# Patient Record
Sex: MALE | Race: WHITE | URBAN - METROPOLITAN AREA
[De-identification: names, ages, dates, MRNs, and addresses within clinical notes are randomized per-mention and may not be internally consistent; named-entity substitution may affect disease eponyms.]

---

## 2017-01-01 ENCOUNTER — NURSING HOME VISIT (OUTPATIENT)
Dept: GERIATRICS | Facility: CLINIC | Age: 82
End: 2017-01-01
Payer: COMMERCIAL

## 2017-01-01 ENCOUNTER — TRANSFERRED RECORDS (OUTPATIENT)
Dept: HEALTH INFORMATION MANAGEMENT | Facility: CLINIC | Age: 82
End: 2017-01-01

## 2017-01-01 ENCOUNTER — ANTICOAGULATION THERAPY VISIT (OUTPATIENT)
Dept: NURSING | Facility: CLINIC | Age: 82
End: 2017-01-01

## 2017-01-01 ENCOUNTER — TELEPHONE (OUTPATIENT)
Dept: FAMILY MEDICINE | Facility: CLINIC | Age: 82
End: 2017-01-01

## 2017-01-01 ENCOUNTER — NURSING HOME VISIT (OUTPATIENT)
Dept: GERIATRICS | Facility: CLINIC | Age: 82
End: 2017-01-01

## 2017-01-01 ENCOUNTER — OFFICE VISIT (OUTPATIENT)
Dept: FAMILY MEDICINE | Facility: CLINIC | Age: 82
End: 2017-01-01
Payer: COMMERCIAL

## 2017-01-01 ENCOUNTER — DISCHARGE SUMMARY NURSING HOME (OUTPATIENT)
Dept: GERIATRICS | Facility: CLINIC | Age: 82
End: 2017-01-01
Payer: COMMERCIAL

## 2017-01-01 ENCOUNTER — TELEPHONE (OUTPATIENT)
Dept: GERIATRICS | Facility: CLINIC | Age: 82
End: 2017-01-01

## 2017-01-01 ENCOUNTER — MEDICAL CORRESPONDENCE (OUTPATIENT)
Dept: HEALTH INFORMATION MANAGEMENT | Facility: CLINIC | Age: 82
End: 2017-01-01

## 2017-01-01 VITALS
BODY MASS INDEX: 28.75 KG/M2 | OXYGEN SATURATION: 94 % | DIASTOLIC BLOOD PRESSURE: 90 MMHG | SYSTOLIC BLOOD PRESSURE: 181 MMHG | TEMPERATURE: 97.5 F | RESPIRATION RATE: 19 BRPM | WEIGHT: 183.2 LBS | HEART RATE: 69 BPM | HEIGHT: 67 IN

## 2017-01-01 VITALS
HEART RATE: 80 BPM | TEMPERATURE: 97.5 F | DIASTOLIC BLOOD PRESSURE: 98 MMHG | BODY MASS INDEX: 30.13 KG/M2 | WEIGHT: 192.4 LBS | OXYGEN SATURATION: 95 % | SYSTOLIC BLOOD PRESSURE: 181 MMHG | RESPIRATION RATE: 20 BRPM

## 2017-01-01 VITALS
RESPIRATION RATE: 24 BRPM | SYSTOLIC BLOOD PRESSURE: 148 MMHG | DIASTOLIC BLOOD PRESSURE: 74 MMHG | OXYGEN SATURATION: 95 % | HEART RATE: 80 BPM | TEMPERATURE: 98 F

## 2017-01-01 VITALS
HEART RATE: 78 BPM | BODY MASS INDEX: 30.13 KG/M2 | WEIGHT: 195.6 LBS | OXYGEN SATURATION: 91 % | BODY MASS INDEX: 30.64 KG/M2 | TEMPERATURE: 98.1 F | SYSTOLIC BLOOD PRESSURE: 118 MMHG | HEART RATE: 74 BPM | TEMPERATURE: 97.9 F | WEIGHT: 192.4 LBS | DIASTOLIC BLOOD PRESSURE: 111 MMHG | OXYGEN SATURATION: 98 % | RESPIRATION RATE: 17 BRPM | RESPIRATION RATE: 18 BRPM | SYSTOLIC BLOOD PRESSURE: 175 MMHG | DIASTOLIC BLOOD PRESSURE: 67 MMHG

## 2017-01-01 VITALS
BODY MASS INDEX: 27.25 KG/M2 | HEART RATE: 101 BPM | OXYGEN SATURATION: 91 % | SYSTOLIC BLOOD PRESSURE: 187 MMHG | RESPIRATION RATE: 24 BRPM | WEIGHT: 174 LBS | TEMPERATURE: 97.6 F | DIASTOLIC BLOOD PRESSURE: 104 MMHG

## 2017-01-01 VITALS
OXYGEN SATURATION: 95 % | BODY MASS INDEX: 27.25 KG/M2 | WEIGHT: 174 LBS | HEART RATE: 83 BPM | DIASTOLIC BLOOD PRESSURE: 69 MMHG | RESPIRATION RATE: 20 BRPM | TEMPERATURE: 96.3 F | SYSTOLIC BLOOD PRESSURE: 95 MMHG

## 2017-01-01 VITALS
DIASTOLIC BLOOD PRESSURE: 101 MMHG | RESPIRATION RATE: 20 BRPM | OXYGEN SATURATION: 95 % | TEMPERATURE: 97.7 F | HEART RATE: 66 BPM | SYSTOLIC BLOOD PRESSURE: 156 MMHG

## 2017-01-01 VITALS
DIASTOLIC BLOOD PRESSURE: 114 MMHG | SYSTOLIC BLOOD PRESSURE: 195 MMHG | RESPIRATION RATE: 20 BRPM | WEIGHT: 174 LBS | BODY MASS INDEX: 27.25 KG/M2 | OXYGEN SATURATION: 93 % | TEMPERATURE: 97.2 F | HEART RATE: 87 BPM

## 2017-01-01 VITALS
HEART RATE: 90 BPM | TEMPERATURE: 98.3 F | OXYGEN SATURATION: 95 % | RESPIRATION RATE: 18 BRPM | SYSTOLIC BLOOD PRESSURE: 176 MMHG | DIASTOLIC BLOOD PRESSURE: 113 MMHG | BODY MASS INDEX: 27.25 KG/M2 | WEIGHT: 174 LBS

## 2017-01-01 VITALS
DIASTOLIC BLOOD PRESSURE: 96 MMHG | OXYGEN SATURATION: 95 % | BODY MASS INDEX: 28.69 KG/M2 | WEIGHT: 183.2 LBS | RESPIRATION RATE: 20 BRPM | HEART RATE: 77 BPM | TEMPERATURE: 97.6 F | SYSTOLIC BLOOD PRESSURE: 161 MMHG

## 2017-01-01 VITALS
RESPIRATION RATE: 20 BRPM | OXYGEN SATURATION: 94 % | DIASTOLIC BLOOD PRESSURE: 119 MMHG | HEART RATE: 89 BPM | TEMPERATURE: 98.6 F | BODY MASS INDEX: 27.25 KG/M2 | WEIGHT: 174 LBS | SYSTOLIC BLOOD PRESSURE: 175 MMHG

## 2017-01-01 VITALS
SYSTOLIC BLOOD PRESSURE: 172 MMHG | RESPIRATION RATE: 22 BRPM | HEART RATE: 67 BPM | DIASTOLIC BLOOD PRESSURE: 96 MMHG | OXYGEN SATURATION: 94 % | TEMPERATURE: 97.4 F

## 2017-01-01 VITALS
DIASTOLIC BLOOD PRESSURE: 91 MMHG | HEART RATE: 89 BPM | TEMPERATURE: 98.6 F | WEIGHT: 180.4 LBS | BODY MASS INDEX: 28.25 KG/M2 | SYSTOLIC BLOOD PRESSURE: 167 MMHG | RESPIRATION RATE: 22 BRPM | OXYGEN SATURATION: 95 %

## 2017-01-01 VITALS
TEMPERATURE: 98.3 F | SYSTOLIC BLOOD PRESSURE: 154 MMHG | HEART RATE: 64 BPM | RESPIRATION RATE: 16 BRPM | HEIGHT: 67 IN | DIASTOLIC BLOOD PRESSURE: 74 MMHG | OXYGEN SATURATION: 93 %

## 2017-01-01 VITALS
DIASTOLIC BLOOD PRESSURE: 85 MMHG | SYSTOLIC BLOOD PRESSURE: 138 MMHG | RESPIRATION RATE: 20 BRPM | OXYGEN SATURATION: 97 % | HEART RATE: 71 BPM | TEMPERATURE: 97.5 F

## 2017-01-01 VITALS
RESPIRATION RATE: 20 BRPM | DIASTOLIC BLOOD PRESSURE: 80 MMHG | HEART RATE: 60 BPM | SYSTOLIC BLOOD PRESSURE: 134 MMHG | TEMPERATURE: 98.3 F

## 2017-01-01 VITALS
TEMPERATURE: 96.6 F | DIASTOLIC BLOOD PRESSURE: 64 MMHG | SYSTOLIC BLOOD PRESSURE: 132 MMHG | OXYGEN SATURATION: 97 % | WEIGHT: 189 LBS | HEIGHT: 67 IN | BODY MASS INDEX: 29.66 KG/M2 | HEART RATE: 66 BPM

## 2017-01-01 DIAGNOSIS — I63.412 CEREBRAL INFARCTION DUE TO EMBOLISM OF LEFT MIDDLE CEREBRAL ARTERY (H): ICD-10-CM

## 2017-01-01 DIAGNOSIS — G47.33 OSA (OBSTRUCTIVE SLEEP APNEA): ICD-10-CM

## 2017-01-01 DIAGNOSIS — I25.2 HISTORY OF MI (MYOCARDIAL INFARCTION): ICD-10-CM

## 2017-01-01 DIAGNOSIS — M25.562 ACUTE PAIN OF BOTH KNEES: Primary | ICD-10-CM

## 2017-01-01 DIAGNOSIS — M25.561 ACUTE PAIN OF BOTH KNEES: Primary | ICD-10-CM

## 2017-01-01 DIAGNOSIS — Z79.01 LONG-TERM (CURRENT) USE OF ANTICOAGULANTS: ICD-10-CM

## 2017-01-01 DIAGNOSIS — I48.0 PAROXYSMAL ATRIAL FIBRILLATION (H): ICD-10-CM

## 2017-01-01 DIAGNOSIS — R33.9 URINARY RETENTION: ICD-10-CM

## 2017-01-01 DIAGNOSIS — E87.1 HYPONATREMIA: ICD-10-CM

## 2017-01-01 DIAGNOSIS — Z86.73 H/O ISCHEMIC LEFT MCA STROKE: ICD-10-CM

## 2017-01-01 DIAGNOSIS — M25.562 ACUTE PAIN OF BOTH KNEES: ICD-10-CM

## 2017-01-01 DIAGNOSIS — I10 BENIGN ESSENTIAL HYPERTENSION: ICD-10-CM

## 2017-01-01 DIAGNOSIS — M1A.0690 CHRONIC GOUT OF KNEE, UNSPECIFIED CAUSE, UNSPECIFIED LATERALITY: ICD-10-CM

## 2017-01-01 DIAGNOSIS — I48.0 PAROXYSMAL ATRIAL FIBRILLATION (H): Primary | ICD-10-CM

## 2017-01-01 DIAGNOSIS — F03.90 DEMENTIA WITHOUT BEHAVIORAL DISTURBANCE, UNSPECIFIED DEMENTIA TYPE: ICD-10-CM

## 2017-01-01 DIAGNOSIS — R29.6 RECURRENT FALLS: Primary | ICD-10-CM

## 2017-01-01 DIAGNOSIS — I10 HYPERTENSION GOAL BP (BLOOD PRESSURE) < 140/90: Primary | ICD-10-CM

## 2017-01-01 DIAGNOSIS — R53.81 PHYSICAL DECONDITIONING: ICD-10-CM

## 2017-01-01 DIAGNOSIS — I63.412 CEREBRAL INFARCTION DUE TO EMBOLISM OF LEFT MIDDLE CEREBRAL ARTERY (H): Primary | ICD-10-CM

## 2017-01-01 DIAGNOSIS — M10.9 GOUT, UNSPECIFIED CAUSE, UNSPECIFIED CHRONICITY, UNSPECIFIED SITE: ICD-10-CM

## 2017-01-01 DIAGNOSIS — R41.89 COGNITIVE IMPAIRMENT: ICD-10-CM

## 2017-01-01 DIAGNOSIS — M15.0 PRIMARY OSTEOARTHRITIS INVOLVING MULTIPLE JOINTS: ICD-10-CM

## 2017-01-01 DIAGNOSIS — M25.562 ACUTE PAIN OF LEFT KNEE: Primary | ICD-10-CM

## 2017-01-01 DIAGNOSIS — F03.91 DEMENTIA WITH BEHAVIORAL DISTURBANCE, UNSPECIFIED DEMENTIA TYPE: ICD-10-CM

## 2017-01-01 DIAGNOSIS — Z86.73 HISTORY OF CVA (CEREBROVASCULAR ACCIDENT): ICD-10-CM

## 2017-01-01 DIAGNOSIS — I10 BENIGN ESSENTIAL HYPERTENSION: Primary | ICD-10-CM

## 2017-01-01 DIAGNOSIS — C64.1 RENAL CELL CARCINOMA OF RIGHT KIDNEY (H): ICD-10-CM

## 2017-01-01 DIAGNOSIS — I10 HYPERTENSION GOAL BP (BLOOD PRESSURE) < 140/90: ICD-10-CM

## 2017-01-01 DIAGNOSIS — E78.5 HYPERLIPIDEMIA, UNSPECIFIED HYPERLIPIDEMIA TYPE: ICD-10-CM

## 2017-01-01 DIAGNOSIS — I48.91 ATRIAL FIBRILLATION, UNSPECIFIED TYPE (H): ICD-10-CM

## 2017-01-01 DIAGNOSIS — E78.5 HYPERLIPIDEMIA LDL GOAL <130: ICD-10-CM

## 2017-01-01 DIAGNOSIS — M25.512 ACUTE PAIN OF LEFT SHOULDER: Primary | ICD-10-CM

## 2017-01-01 DIAGNOSIS — M17.0 OSTEOARTHRITIS OF BOTH KNEES, UNSPECIFIED OSTEOARTHRITIS TYPE: ICD-10-CM

## 2017-01-01 DIAGNOSIS — R60.0 LOCALIZED EDEMA: ICD-10-CM

## 2017-01-01 DIAGNOSIS — E78.2 MIXED HYPERLIPIDEMIA: ICD-10-CM

## 2017-01-01 DIAGNOSIS — R29.6 RECURRENT FALLS: ICD-10-CM

## 2017-01-01 DIAGNOSIS — J06.9 ACUTE URI: Primary | ICD-10-CM

## 2017-01-01 DIAGNOSIS — K21.9 GASTROESOPHAGEAL REFLUX DISEASE, ESOPHAGITIS PRESENCE NOT SPECIFIED: ICD-10-CM

## 2017-01-01 DIAGNOSIS — C64.1 RENAL CELL CARCINOMA, RIGHT (H): ICD-10-CM

## 2017-01-01 DIAGNOSIS — G93.40 ENCEPHALOPATHY: ICD-10-CM

## 2017-01-01 DIAGNOSIS — M25.562 ACUTE PAIN OF LEFT KNEE: ICD-10-CM

## 2017-01-01 DIAGNOSIS — E78.00 PURE HYPERCHOLESTEROLEMIA: ICD-10-CM

## 2017-01-01 DIAGNOSIS — M25.561 ACUTE PAIN OF BOTH KNEES: ICD-10-CM

## 2017-01-01 DIAGNOSIS — M10.9 ACUTE GOUT OF RIGHT KNEE, UNSPECIFIED CAUSE: ICD-10-CM

## 2017-01-01 DIAGNOSIS — M17.0 PRIMARY OSTEOARTHRITIS OF BOTH KNEES: ICD-10-CM

## 2017-01-01 DIAGNOSIS — Z51.5 HOSPICE CARE PATIENT: Primary | ICD-10-CM

## 2017-01-01 DIAGNOSIS — K21.9 GASTROESOPHAGEAL REFLUX DISEASE WITHOUT ESOPHAGITIS: ICD-10-CM

## 2017-01-01 LAB
ANION GAP SERPL CALCULATED.3IONS-SCNC: 12 MMOL/L (ref 5–18)
ANION GAP SERPL CALCULATED.3IONS-SCNC: 13 MMOL/L (ref 3–14)
ANION GAP SERPL CALCULATED.3IONS-SCNC: 7 MMOL/L (ref 5–18)
ANION GAP SERPL CALCULATED.3IONS-SCNC: 9 MMOL/L (ref 5–18)
BUN SERPL-MCNC: 16 MG/DL (ref 8–28)
BUN SERPL-MCNC: 19 MG/DL (ref 7–30)
BUN SERPL-MCNC: 19 MG/DL (ref 8–28)
BUN SERPL-MCNC: 20 MG/DL (ref 8–28)
CALCIUM SERPL-MCNC: 8.9 MG/DL (ref 8.5–10.5)
CALCIUM SERPL-MCNC: 9.3 MG/DL (ref 8.5–10.5)
CALCIUM SERPL-MCNC: 9.4 MG/DL (ref 8.5–10.1)
CALCIUM SERPL-MCNC: 9.6 MG/DL (ref 8.5–10.5)
CHLORIDE SERPL-SCNC: 96 MMOL/L (ref 94–109)
CHLORIDE SERPLBLD-SCNC: 100 MMOL/L (ref 98–107)
CHLORIDE SERPLBLD-SCNC: 102 MMOL/L (ref 98–107)
CHLORIDE SERPLBLD-SCNC: 98 MMOL/L (ref 98–107)
CO2 SERPL-SCNC: 24 MMOL/L (ref 22–31)
CO2 SERPL-SCNC: 27 MMOL/L (ref 20–32)
CO2 SERPL-SCNC: 27 MMOL/L (ref 22–31)
CO2 SERPL-SCNC: 33 MMOL/L (ref 22–31)
CREAT SERPL-MCNC: 0.64 MG/DL (ref 0.7–1.3)
CREAT SERPL-MCNC: 0.79 MG/DL (ref 0.7–1.3)
CREAT SERPL-MCNC: 0.82 MG/DL (ref 0.66–1.25)
CREAT SERPL-MCNC: 0.89 MG/DL (ref 0.7–1.3)
DIFFERENTIAL: ABNORMAL
ERYTHROCYTE [DISTWIDTH] IN BLOOD BY AUTOMATED COUNT: 13.7 % (ref 11–14.5)
ERYTHROCYTE [DISTWIDTH] IN BLOOD BY AUTOMATED COUNT: 13.9 % (ref 11–14.5)
ERYTHROCYTE [DISTWIDTH] IN BLOOD BY AUTOMATED COUNT: 14.6 % (ref 11–14.5)
GFR SERPL CREATININE-BSD FRML MDRD: 89 ML/MIN/1.7M2
GFR SERPL CREATININE-BSD FRML MDRD: >60 ML/MIN/1.73M2
GLUCOSE SERPL-MCNC: 109 MG/DL (ref 70–99)
GLUCOSE SERPL-MCNC: 124 MG/DL (ref 70–125)
GLUCOSE SERPL-MCNC: 81 MG/DL (ref 70–125)
GLUCOSE SERPL-MCNC: 92 MG/DL (ref 70–125)
HCT VFR BLD AUTO: 38.4 % (ref 40–54)
HCT VFR BLD AUTO: 38.7 % (ref 40–54)
HCT VFR BLD AUTO: 40 % (ref 40–54)
HEMOGLOBIN: 12.4 G/DL (ref 14–18)
HEMOGLOBIN: 12.6 G/DL (ref 14–18)
HEMOGLOBIN: 13.6 G/DL (ref 14–18)
INR PPP: 2.55
INR PPP: 3.02 (ref 0.9–1.1)
MCH RBC QN AUTO: 30.9 PG (ref 27–34)
MCH RBC QN AUTO: 31.3 PG (ref 27–34)
MCH RBC QN AUTO: 32.1 PG (ref 27–34)
MCHC RBC AUTO-ENTMCNC: 32.3 G/DL (ref 32–36)
MCHC RBC AUTO-ENTMCNC: 32.6 G/DL (ref 32–36)
MCHC RBC AUTO-ENTMCNC: 34 G/DL (ref 32–36)
MCV RBC AUTO: 94 FL (ref 80–100)
MCV RBC AUTO: 96 FL (ref 80–100)
MCV RBC AUTO: 96 FL (ref 80–100)
PLATELET # BLD AUTO: 263 THOU/UL (ref 140–440)
PLATELET # BLD AUTO: 352 THOU/UL (ref 140–440)
PLATELET # BLD AUTO: 429 THOU/UL (ref 140–440)
POTASSIUM SERPL-SCNC: 3.5 MMOL/L (ref 3.4–5.3)
POTASSIUM SERPL-SCNC: 4 MMOL/L (ref 3.5–5)
POTASSIUM SERPL-SCNC: 4.2 MMOL/L (ref 3.5–5)
POTASSIUM SERPL-SCNC: 4.2 MMOL/L (ref 3.5–5)
RBC # BLD AUTO: 4.01 MILL/UL (ref 4.4–6.2)
RBC # BLD AUTO: 4.03 MILL/UL (ref 4.4–6.2)
RBC # BLD AUTO: 4.24 MILL/UL (ref 4.4–6.2)
SODIUM SERPL-SCNC: 136 MMOL/L (ref 133–144)
SODIUM SERPL-SCNC: 136 MMOL/L (ref 136–145)
SODIUM SERPL-SCNC: 138 MMOL/L (ref 136–145)
SODIUM SERPL-SCNC: 138 MMOL/L (ref 136–145)
WBC # BLD AUTO: 11.4 THOU/UL (ref 4–11)
WBC # BLD AUTO: 5.9 THOU/UL (ref 4–11)
WBC # BLD AUTO: 9.7 THOU/UL (ref 4–11)

## 2017-01-01 PROCEDURE — 99309 SBSQ NF CARE MODERATE MDM 30: CPT | Performed by: NURSE PRACTITIONER

## 2017-01-01 PROCEDURE — 99310 SBSQ NF CARE HIGH MDM 45: CPT | Performed by: NURSE PRACTITIONER

## 2017-01-01 PROCEDURE — 99306 1ST NF CARE HIGH MDM 50: CPT | Performed by: INTERNAL MEDICINE

## 2017-01-01 PROCEDURE — 99207 ZZC CDG-CORRECTLY CODED, REVIEWED AND AGREE: CPT | Performed by: NURSE PRACTITIONER

## 2017-01-01 PROCEDURE — 99316 NF DSCHRG MGMT 30 MIN+: CPT | Performed by: NURSE PRACTITIONER

## 2017-01-01 PROCEDURE — 99207 ZZC CDG-CORRECTLY CODED, REVIEWED AND AGREE: CPT | Performed by: INTERNAL MEDICINE

## 2017-01-01 PROCEDURE — 36415 COLL VENOUS BLD VENIPUNCTURE: CPT | Performed by: FAMILY MEDICINE

## 2017-01-01 PROCEDURE — 80048 BASIC METABOLIC PNL TOTAL CA: CPT | Performed by: FAMILY MEDICINE

## 2017-01-01 PROCEDURE — 99214 OFFICE O/P EST MOD 30 MIN: CPT | Performed by: FAMILY MEDICINE

## 2017-01-01 RX ORDER — POLYETHYLENE GLYCOL 3350 17 G/17G
17 POWDER, FOR SOLUTION ORAL DAILY PRN
COMMUNITY

## 2017-01-01 RX ORDER — ATENOLOL 25 MG/1
25 TABLET ORAL DAILY
COMMUNITY
End: 2017-01-01

## 2017-01-01 RX ORDER — QUETIAPINE FUMARATE 25 MG/1
12.5 TABLET, FILM COATED ORAL DAILY PRN
COMMUNITY
End: 2017-01-01

## 2017-01-01 RX ORDER — TAMSULOSIN HYDROCHLORIDE 0.4 MG/1
0.4 CAPSULE ORAL DAILY
COMMUNITY
End: 2017-01-01

## 2017-01-01 RX ORDER — WARFARIN SODIUM 2.5 MG/1
2.5 TABLET ORAL DAILY
Qty: 7 TABLET | Refills: 0 | Status: SHIPPED | OUTPATIENT
Start: 2017-01-01 | End: 2017-01-01

## 2017-01-01 RX ORDER — ATROPINE SULFATE 10 MG/ML
2 SOLUTION/ DROPS OPHTHALMIC EVERY 4 HOURS PRN
COMMUNITY

## 2017-01-01 RX ORDER — MORPHINE SULFATE 10 MG/5ML
5 SOLUTION ORAL
COMMUNITY
End: 2017-01-01

## 2017-01-01 RX ORDER — HYDROCODONE BITARTRATE AND ACETAMINOPHEN 5; 325 MG/1; MG/1
1 TABLET ORAL EVERY 4 HOURS PRN
COMMUNITY
End: 2017-01-01

## 2017-01-01 RX ORDER — LOSARTAN POTASSIUM 25 MG/1
100 TABLET ORAL DAILY
COMMUNITY
End: 2017-01-01

## 2017-01-01 RX ORDER — ALLOPURINOL 100 MG/1
100 TABLET ORAL DAILY
COMMUNITY
End: 2017-01-01

## 2017-01-01 ASSESSMENT — PAIN SCALES - GENERAL: PAINLEVEL: NO PAIN (0)

## 2017-03-09 NOTE — NURSING NOTE
"Chief Complaint   Patient presents with     URI       Initial Pulse 66  Temp 96.6  F (35.9  C) (Oral)  Ht 5' 7\" (1.702 m)  Wt 189 lb (85.7 kg)  SpO2 97%  BMI 29.6 kg/m2 Estimated body mass index is 29.6 kg/(m^2) as calculated from the following:    Height as of this encounter: 5' 7\" (1.702 m).    Weight as of this encounter: 189 lb (85.7 kg).  Medication Reconciliation: complete   Darren Harper CMA      "

## 2017-03-09 NOTE — LETTER
[facility]  [address] [place]  [address]  [phone]        March 10, 2017    Charlie Hayes  [address] [facility] [address]        Dear Charlie,    Your basic metabolic panel which includes electrolytes,kidney function is normal and  -Glucose (diabetic screening test) is a little high. It is in the same range as in the past     Follow up in 6 month(s)     Results for orders placed or performed in visit on 03/09/17   Basic metabolic panel   Result Value Ref Range    Sodium 136 133 - 144 mmol/L    Potassium 3.5 3.4 - 5.3 mmol/L    Chloride 96 94 - 109 mmol/L    Carbon Dioxide 27 20 - 32 mmol/L    Anion Gap 13 3 - 14 mmol/L    Glucose 109 (H) 70 - 99 mg/dL    Urea Nitrogen 19 7 - 30 mg/dL    Creatinine 0.82 0.66 - 1.25 mg/dL    GFR Estimate 89 >60 mL/min/1.7m2    GFR Estimate If Black >90   GFR Calc   >60 mL/min/1.7m2    Calcium 9.4 8.5 - 10.1 mg/dL       If you have any questions please call the clinic at [phone].    Sincerely,    Fito Prather MD  SKL

## 2017-03-09 NOTE — MR AVS SNAPSHOT
After Visit Summary   3/9/2017    Charlie Hayes    MRN: [id number]           Patient Information     Date Of Birth          2/13/1927        Visit Information        Provider Department      3/9/2017 1:20 PM Fito Prather MD [facility]        Today's Diagnoses     Acute URI    -  1    Hypertension goal BP (blood pressure) < 140/90        Hyperlipidemia LDL goal <130           Follow-ups after your visit        Follow-up notes from your care team     Return in about 6 months (around 9/9/2017) for hyperlipidemia, BP Recheck.      Your next 10 appointments already scheduled     Jun 05, 2017  9:00 AM CDT   Office Visit with Fito Prather MD   [facility] ([facility]    [address] [place] [id number] [phone]           Bring a current list of meds and any records pertaining to this visit.  For Physicals, please bring immunization records and any forms needing to be filled out.  Please arrive 10 minutes early to complete paperwork.            Nov 06, 2017 10:00 AM CST   New Visit with Vincent De Luna MD   [facility] ([address] [id number] [phone]              Who to contact     If you have questions or need follow up information about today's clinic visit or your schedule please contact [facility] directly at [phone].  Normal or non-critical lab and imaging results will be communicated to you by MyChart, letter or phone within 4 business days after the clinic has received the results. If you do not hear from us within 7 days, please contact the clinic through MyChart or phone. If you have a critical or abnormal lab result, we will notify you by phone as soon as possible.  Submit refill requests through [employer] or call your pharmacy and they will forward the refill request to us. Please  "allow 3 business days for your refill to be completed.          Additional Information About Your Visit        MyChart Information     [employer] lets you send messages to your doctor, view your test results, renew your prescriptions, schedule appointments and more. To sign up, go to [email or web address] . Click on \"Log in\" on the left side of the screen, which will take you to the Welcome page. Then click on \"Sign up Now\" on the right side of the page.     You will be asked to enter the access code listed below, as well as some personal information. Please follow the directions to create your username and password.     Your access code is: [email or web address]  Expires: 2017  1:44 PM     Your access code will  in 90 days. If you need help or a new code, please call your [facility] clinic or [phone].        Care EveryWhere ID     This is your [facility] EveryWhere ID. This could be used by other organizations to access your [facility] medical records  [id number]        Your Vitals Were     Pulse Temperature Height Pulse Oximetry BMI (Body Mass Index)       66 96.6  F (35.9  C) (Oral) 5' 7\" (1.702 m) 97% 29.6 kg/m2        Blood Pressure from Last 3 Encounters:   17 132/64   10/18/16 129/57   16 142/60    Weight from Last 3 Encounters:   17 189 lb (85.7 kg)   10/18/16 184 lb (83.5 kg)   16 190 lb (86.2 kg)              We Performed the Following     Basic metabolic panel        Primary Care Provider Office Phone # Fax #    Fito Prather [phone]       [facility] [address] [facility] [address]        Thank you!     Thank you for choosing [facility]  for your care. Our goal is always to provide you with excellent care. Hearing back from our patients is one way we can continue to improve our services. Please take a few minutes to complete the written survey that you may receive in the mail after your visit with us. " Thank you!             Your Updated Medication List - Protect others around you: Learn how to safely use, store and throw away your medicines at www.disposemymeds.org.          This list is accurate as of: 3/9/17  1:44 PM.  Always use your most recent med list.                   Brand Name Dispense Instructions for use    amLODIPine 10 MG tablet    NORVASC     1 TABLET DAILY       aspirin 81 MG tablet      Take by mouth daily 1 tablet daily       atenolol 100 MG tablet    TENORMIN    90 tablet    Take 1 tablet (100 mg) by mouth daily       CALCIUM 600 + D 600-200 MG-UNIT Tabs      Take 1 tablet by mouth daily.       carboxymethylcellulose 0.5 % Soln ophthalmic solution    REFRESH PLUS     Place 1 drop into both eyes 2 times daily       COZAAR 100 MG tablet   Generic drug:  losartan      Take 100 mg by mouth daily.       fish oil-omega-3 fatty acids 1000 MG capsule      Take 300 mg by mouth daily. 1 capsule  daily.       FLAXSEED      daily       Glucosamine HCl 1000 MG Tabs      Take by mouth daily       hydrALAZINE 50 MG tablet    APRESOLINE     4 tabs a day       hydrochlorothiazide 25 MG tablet    HYDRODIURIL    100 tablet    Take 1 tablet (25 mg) by mouth every morning       loratadine 10 MG tablet    CLARITIN    10 tablet    Take 1 tablet (10 mg) by mouth daily       MULTIVITAL Tabs      1 TABLET DAILY       nitroglycerin 0.4 MG sublingual tablet    NITROQUICK    25 tablet    Place 1 tablet under the tongue every 5 minutes as needed (1 tablet every 5 minutes as needed- Up to 3 per episode.). up to 3 tablets per episode.       omeprazole 20 MG CR capsule    priLOSEC     1 CAPSULE DAILY       pravastatin 20 MG tablet    PRAVACHOL    90 tablet    Take 1 tablet (20 mg) by mouth daily (Due for Cholesterol labs for more refills)       sulindac 200 MG tablet    CLINORIL    60 tablet    Patient taking 1/2 pill in the morning and 1/2 pill at night.

## 2017-03-09 NOTE — PROGRESS NOTES
"  SUBJECTIVE:                                                    Charlie Hayes is a 89 year old male who presents to clinic today for the following health issues:      ENT Symptoms             Symptoms: cc Present Absent Comment   Fever/Chills   x    Fatigue  x     Muscle Aches  x     Eye Irritation   x    Sneezing   x    Nasal Keny/Drg  x     Sinus Pressure/Pain  x     Loss of smell   x    Dental pain   x    Sore Throat   x    Swollen Glands   x    Ear Pain/Fullness   x    Cough  x     Wheeze   x    Chest Pain   x    Shortness of breath   x    Rash   x    Other         Symptom duration:  1 week    Symptom severity:  moderate    Treatments tried:  OTC cough medications    Contacts:  Wife          O: /64  Pulse 66  Temp 96.6  F (35.9  C) (Oral)  Ht 5' 7\" (1.702 m)  Wt 189 lb (85.7 kg)  SpO2 97%  BMI 29.6 kg/m2      Head: Normocephalic, atraumatic.  Eyes: Conjunctiva clear, non icteric. PERRLA.  Ears: External ears and TMs normal BL.    Patient has red eyes   He has chronic dry eyes and uses drops for this     Nose: Septum midline, nasal mucosa pink and moist. No discharge.  Mouth / Throat: Normal dentition.  No oral lesions. Pharynx non erythematous, tonsils without hypertrophy.  Neck: Supple, no enlarged LN, trachea midline.    Chest wall normal to inspection and palpation. Good excursion bilaterally. Lungs clear to auscultation. Good air movement bilaterally without rales, wheezes, or rhonchi.   Regular rate and  rhythm. S1 and S2 normal, no murmurs, clicks, gallops or rubs. No edema or JVD.    (J06.9) Acute URI  (primary encounter diagnosis)  Comment: improving   Plan: use totc meds     (I10) Hypertension goal BP (blood pressure) < 140/90  Comment: current dose is good   Plan: bmp     (E78.5) Hyperlipidemia LDL goal <130  Comment: current on labs   Plan:  No blood work til 11/2017        "

## 2017-03-10 NOTE — PROGRESS NOTES
Your basic metabolic panel which includes electrolytes,kidney function is normal and  -Glucose (diabetic screening test) is a little high. It is in the same range as in the past     Follow up in 6 month(s)

## 2017-03-30 PROBLEM — G47.33 OSA (OBSTRUCTIVE SLEEP APNEA): Status: ACTIVE | Noted: 2017-01-01

## 2017-03-30 PROBLEM — I25.2 HISTORY OF MI (MYOCARDIAL INFARCTION): Status: ACTIVE | Noted: 2017-01-01

## 2017-03-30 PROBLEM — I10 BENIGN ESSENTIAL HYPERTENSION: Status: ACTIVE | Noted: 2017-01-01

## 2017-03-30 PROBLEM — M10.9 GOUT: Status: ACTIVE | Noted: 2017-01-01

## 2017-03-30 PROBLEM — I48.0 PAROXYSMAL ATRIAL FIBRILLATION (H): Status: ACTIVE | Noted: 2017-01-01

## 2017-03-30 PROBLEM — I63.412 CEREBRAL INFARCTION DUE TO EMBOLISM OF LEFT MIDDLE CEREBRAL ARTERY (H): Status: ACTIVE | Noted: 2017-01-01

## 2017-03-30 NOTE — PROGRESS NOTES
"[facility]  PRIMARY CARE PROVIDER AND CLINIC:  Fito Prather [facility] [address] [facility] [place], [place]  Chief Complaint   Patient presents with     Hospital F/U     HPI:    Charlie Hayes is a 89 year old  (2/13/1927),admitted to the [facility] from Hospital  [facility].  Hospital stay 3/21/2017 through 3/28/2017.  Admitted to this facility for  rehab, medical management and nursing care. Hospital course per review of discharge summary:    This is a 89-year-old male, with a past medical history significant for GERD, hypertension, hyperlipidemia, previous MI, ALMITA and macular degeneration, who was admitted to [facility] with lower extremity weakness and gait disturbance. Initial head CT unremarkable. Developed increased right-sided weakness overnight and a brain MRI revealed \"Small foci of highly restricted diffusion at the left frontoparietal junction compatible with acute nonhemorrhagic infarcts\". 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 Diagnosed with an acute cerebral infarction of the left MCA distribution, likely embolic shower from left atrium. Neurology consulted. Initiated Plavix. Noted to have runs of atrial fibrillation. Echocardiogram revealed EF 55-60%. Cardiology consulted and started on Warfarin. Plavix discontinued due to falls/hemorrhage risk and irreversibility. Treated for gout of the right knee with Colchicine and Solu-Medrol. A TCU stay was recommended for ongoing physical rehabilitation.     Current issues are:       Discusses events leading up to hospitalization. Does not notice any impact from the stroke. States right knee pain has greatly improved. Is looking forward to going home. Used a cane for mobility at times prior to hospitalization. Lives at home with his wife and would like to return there upon discharge from TCU. "      CODE STATUS/ADVANCE DIRECTIVES DISCUSSION:   DNR / DNI  Patient's living condition: lives with spouse    ALLERGIES:Keflex [cephalexin monohydrate]; Ibuprin [ibuprofen micronized]; Indocin [indometacin sodium]; and Nuts  PAST MEDICAL HISTORY:  has a past medical history of AR (allergic rhinitis); Colon polyps, hyperplastic (11/15/94); High cholesterol; HTN (hypertension); Lipoma; Macular degeneration; MI (myocardial infarction) (H) (05/01); Nasal fracture (1969); Nonsenile cataract; ALMITA (obstructive sleep apnea) (07/01); and S/P amputation of finger through MCP joint. He also has no past medical history of Amblyopia; Arthritis; Diabetes (H); Diabetic retinopathy (H); Glaucoma; Malignant hyperthermia; Retinal detachment; Strabismus; or Uveitis.  PAST SURGICAL HISTORY:  has a past surgical history that includes NONSPECIFIC PROCEDURE (1968-69); sinus surgery (1969); COLONOSCOPY THRU STOMA W BIOPSY/CAUTERY TUMOR/POLYP/LESION (11/15/95); pvd stenting (2001); Blepharoplasty bilateral (2/2010); Phacoemulsification with standard intraocular lens implant (6/2016); and cataract iol, rt/lt.  FAMILY HISTORY: family history includes Allergies in his mother; Asthma in his mother; CANCER in his mother; Cancer - colorectal in his brother; Cardiovascular in his brother, brother, and brother; Hypertension in his brother and brother; Prostate Cancer in his father; Respiratory in his mother.  SOCIAL HISTORY:  reports that he has never smoked. He has never used smokeless tobacco. He reports that he uses illicit drugs. He reports that he does not drink alcohol.    Post Discharge Medication Reconciliation Status: discharge medications reconciled, continue medications without change.  Current Outpatient Prescriptions   Medication Sig Dispense Refill     allopurinol (ZYLOPRIM) 100 MG tablet Take 100 mg by mouth daily       atenolol (TENORMIN) 25 MG tablet Take 25 mg by mouth daily       losartan (COZAAR) 25 MG tablet Take 25 mg by mouth  "daily       pravastatin (PRAVACHOL) 20 MG tablet Take 1 tablet (20 mg) by mouth daily (Due for Cholesterol labs for more refills) 90 tablet 3     carboxymethylcellulose (REFRESH PLUS) 0.5 % SOLN Place 1 drop into both eyes 2 times daily       nitroglycerin (NITROQUICK) 0.4 MG SL tablet Place 1 tablet under the tongue every 5 minutes as needed (1 tablet every 5 minutes as needed- Up to 3 per episode.). up to 3 tablets per episode. 25 tablet 3     fish oil-omega-3 fatty acids (FISH OIL) 1000 MG capsule Take 300 mg by mouth daily. 1 capsule  daily.        OMEPRAZOLE 20 MG OR CPDR 1 CAPSULE DAILY       FLAXSEED 2400 mg daily       GLUCOSAMINE HCL 1000 MG OR TABS Take 1,500 mg by mouth daily        [DISCONTINUED] atenolol (TENORMIN) 100 MG tablet Take 1 tablet (100 mg) by mouth daily 90 tablet 3       ROS:  4 point ROS including Respiratory, CV, GI and , other than that noted in the HPI,  is negative    Exam:  /74  Pulse 64  Temp 98.3  F (36.8  C)  Resp 16  Ht 5' 7\" (1.702 m)  SpO2 93%  GENERAL APPEARANCE:  Alert, in no distress  ENT:  Mouth and posterior oropharynx normal, moist mucous membranes  EYES:  EOM, conjunctivae, lids, pupils and irises normal  RESP:  respiratory effort and palpation of chest normal, lungs clear to auscultation , no respiratory distress  CV:  Palpation and auscultation of heart done , regular rate and rhythm, no murmur, rub, or gallop  ABDOMEN:  normal bowel sounds, soft, nontender, no hepatosplenomegaly or other masses  M/S:   Active movement of bilateral upper and lower extremtiies. Slight weakness in RUE and hand  SKIN:  Inspection of skin and subcutaneous tissue baseline, Palpation of skin and subcutaneous tissue baseline  NEURO:   Cranial nerves 2-12 are normal tested and grossly at patient's baseline  PSYCH:  affect and mood normal    Lab/Diagnostic data:  CBC WITH AUTO DIFFERENTIAL (03/22/2017 6:05 AM)  CBC WITH AUTO DIFFERENTIAL (03/22/2017 6:05 AM)   Component Value Ref " Range   WHITE BLOOD COUNT  8.7 4.5 - 11.0 thou/cu mm   RED BLOOD COUNT  4.13 (L) 4.30 - 5.90 mil/cu mm   HEMOGLOBIN  12.9 (L) 13.5 - 17.5 g/dL   HEMATOCRIT  37.9 37.0 - 53.0 %   MCV  92 80 - 100 fL   MCH  31.2 26.0 - 34.0 pg   MCHC  34.0 32.0 - 36.0 g/dL   RDW  13.4 11.5 - 15.5 %   PLATELET COUNT  228 140 - 440 thou/cu mm   MPV  9.1 6.5 - 11.0 fL   NEUTROPHILS  81.6 (H) 42.0 - 72.0 %   LYMPHOCYTES  13.0 (L) 20.0 - 44.0 %   MONOCYTES  4.7 <12.0 %   EOSINOPHILS  0.1 <8.0 %   BASOPHILS  0.3 <3.0 %   IMMATURE GRANULOCYTES(METAS,MYELOS,PROS) 0.3 %   ABSOLUTE NEUTROPHILS  7.1 (H) 1.7 - 7.0 thou/cu mm   ABSOLUTE LYMPHOCYTES  1.1 0.9 - 2.9 thou/cu mm   ABSOLUTE MONOCYTES  0.4 <0.9 thou/cu mm   ABSOLUTE EOSINOPHILS  0.0 <0.5 thou/cu mm   ABSOLUTE BASOPHILS  0.0 <0.3 thou/cu mm   ABSOLUTE IMMATURE GRANULOCYTES(METAS,MYELOS,PROS) 0.0 <0.3 thou/cu mm     BASIC METABOLIC PANEL (03/26/2017 6:26 AM)  BASIC METABOLIC PANEL (03/26/2017 6:26 AM)   Component Value Ref Range   SODIUM 139 135 - 145 mmol/L   POTASSIUM 4.1 3.5 - 5.0 mmol/L   CHLORIDE 103 98 - 110 mmol/L   CO2,TOTAL 29 21 - 31 mmol/L   ANION GAP 7 5 - 18    GLUCOSE 112 (H) 65 - 100 mg/dL   CALCIUM 8.9 8.5 - 10.5 mg/dL   BUN 25 8 - 25 mg/dL   CREATININE 0.80 0.72 - 1.25 mg/dL   BUN/CREAT RATIO  31 (H) 10 - 20    GFR if African American >60 >60 ml/min/1.73m2   GFR if not African American >60 >60 ml/min/1.73m2     ASSESSMENT/PLAN:  Acute Cerebral Infarction Left MCA Distribution, Likely Embolic Shower from Left Atrium. Follow-up with Dr. Garcia at [facility] in 4-6 weeks. 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  Started on Warfarin for atrial fibrillation. Plavix discontinued due to falls/hemorrhage risk and irreversibility. Continue Pravastatin as ordered. Physical and Occupational Therapy ordered for deconditioning.    Paroxysmal Atrial Fibrillation. Follow-up with Cardiology on 4/27/17 as scheduled. Cardiology  consulted during hospitalization and Warfarin initiated. INR therapeutic on 3/29/17. Repeat INR on 3/31/17. Continue Atenolol and Warfarin 2.5 mg as ordered.    Gout of the Right Knee. Treated with Colchicine and Solu-Medrol during hospitalization. Continue Allopurinol as ordered.    Hyperlipidemia/Hypertension/History of MI. Monitor blood pressure daily. Continue Atenolol, Fish Oil, Pravastatin, Nitroglycerin and Losartan as ordered.    Obstructive Sleep Apnea. Quit using CPAP.     Gastroesophageal Reflux Disease. Continue Omeprazole as ordered.    Information reviewed:  Medications, vital signs, orders, nursing notes, problem list, hospital information. Total time spent with patient visit was 45 min including patient visit and review of past records. Greater than 50% of total time spent with counseling and coordinating care.    Electronically signed by:  GRISELDA Wyatt CNP

## 2017-04-03 PROBLEM — M25.562 ACUTE PAIN OF LEFT KNEE: Status: ACTIVE | Noted: 2017-01-01

## 2017-04-03 NOTE — PROGRESS NOTES
[place] [facility]    Chief Complaint   Patient presents with     Nursing Home Acute     HPI:    Charlie Hayes is a 89 year old  (2/13/1927), who is being seen today for an episodic care visit at [facility]. Today's concern is:    Paroxysmal Atrial Fibrillation. INR 1.8 on 4/3/17. Previous INR therapeutic at 2.1 on 3/31/17 on 2.5 mg PO QD.     Acute Pain of Left Knee. Complains of left knee pain. Started over the weekend. Worse with movement. Has decreased range of motion. Not as bad as the pain in his right knee when he had a gout flare during his hospital stay. Has not tried any medication for pain.     Acute Cerebral Infarction Left MCA Distribution, Likely Embolic Shower from Left Atrium. Continues to work with Physical and Occupational Therapy. Using Jump or Fall for leg strengthening. Ambulating 125 feet x 2 with FWW and SBA. Has shuffling gait. Tinetti Score 16/28. SBA with bed mobility and toileting.     ALLERGIES: Keflex [cephalexin monohydrate]; Ibuprin [ibuprofen micronized]; Indocin [indometacin sodium]; and Nuts  Past Medical, Surgical, Family and Social History reviewed and updated in [facility].    Current Outpatient Prescriptions   Medication Sig Dispense Refill     allopurinol (ZYLOPRIM) 100 MG tablet Take 100 mg by mouth daily       atenolol (TENORMIN) 25 MG tablet Take 25 mg by mouth daily       losartan (COZAAR) 25 MG tablet Take 25 mg by mouth daily       Calcium Carb-Cholecalciferol (CALCIUM 600 + D PO) Take 1 capsule by mouth daily       WARFARIN SODIUM PO Contact facility for dosing orders       Multiple Vitamins-Minerals (MULTIVITAMIN PO) Take 1 tablet by mouth daily       pravastatin (PRAVACHOL) 20 MG tablet Take 1 tablet (20 mg) by mouth daily (Due for Cholesterol labs for more refills) 90 tablet 3     carboxymethylcellulose (REFRESH PLUS) 0.5 % SOLN Place 1 drop into both eyes 2 times daily       nitroglycerin (NITROQUICK) 0.4 MG SL tablet Place 1 tablet under  the tongue every 5 minutes as needed (1 tablet every 5 minutes as needed- Up to 3 per episode.). up to 3 tablets per episode. 25 tablet 3     fish oil-omega-3 fatty acids (FISH OIL) 1000 MG capsule Take 300 mg by mouth daily. 1 capsule  daily.        OMEPRAZOLE 20 MG OR CPDR 1 CAPSULE DAILY       FLAXSEED 2400 mg daily       GLUCOSAMINE HCL 1000 MG OR TABS Take 1,500 mg by mouth daily        Medications reviewed:  Medications reconciled to facility chart and changes were made to reflect current medications as identified as above med list. Below are the changes that were made:   Medications stopped since last EPIC medication reconciliation:   There are no discontinued medications.    Medications started since last [facility] medication reconciliation:  No orders of the defined types were placed in this encounter.    REVIEW OF SYSTEMS:  4 point ROS including Respiratory, CV, GI and , other than that noted in the HPI,  is negative    Physical Exam:  /74  Pulse 80  Temp 98  F (36.7  C)  Resp 24  SpO2 95%  GENERAL APPEARANCE: Alert, in no distress  ENT: Mouth and posterior oropharynx normal, moist mucous membranes  EYES: EOM, conjunctivae, lids, pupils and irises normal  RESP: respiratory effort and palpation of chest normal, lungs clear to auscultation , no respiratory distress  CV: Palpation and auscultation of heart done , regular rate and rhythm, no murmur, rub, or gallop  ABDOMEN: normal bowel sounds, soft, nontender, no hepatosplenomegaly or other masses  M/S: Active movement of bilateral upper and lower extremtiies. AROM LLE < RLE.   SKIN: Inspection of skin and subcutaneous tissue baseline, Palpation of skin and subcutaneous tissue baseline  NEURO: Cranial nerves 2-12 are normal tested and grossly at patient's baseline  PSYCH: affect and mood normal    Recent Labs:    Lab Results   Component Value Date    WBC 9.7 03/31/2017     Lab Results   Component Value Date    RBC 4.03 03/31/2017     Lab Results    Component Value Date    HGB 12.6 03/31/2017     Lab Results   Component Value Date    HCT 38.7 03/31/2017     No components found for: MCT  Lab Results   Component Value Date    MCV 96 03/31/2017     Lab Results   Component Value Date    MCH 31.3 03/31/2017     Lab Results   Component Value Date    MCHC 32.6 03/31/2017     Lab Results   Component Value Date    RDW 13.7 03/31/2017     Lab Results   Component Value Date     03/31/2017     Last Basic Metabolic Panel:  Lab Results   Component Value Date     03/31/2017      Lab Results   Component Value Date    POTASSIUM 4.0 03/31/2017     Lab Results   Component Value Date    CHLORIDE 102 03/31/2017     Lab Results   Component Value Date    QUYEN 8.9 03/31/2017     Lab Results   Component Value Date    CO2 27 03/31/2017     Lab Results   Component Value Date    BUN 20 03/31/2017     Lab Results   Component Value Date    CR 0.89 03/31/2017     Lab Results   Component Value Date     03/31/2017     Assessment/Plan:  Paroxysmal Atrial Fibrillation. Follow-up with Dr. Barroso at [facility] on 4/27/17 as scheduled. INR subtherapeutic.Will increase Warfarin to 3 mg. Recheck INR on 4/4/17. If INR subtherapeutic once again, may need to initiate Enoxaparin.    Acute Pain of Left Knee. May be secondary to arthritis, increased activity due to therapies and/or compensation for right knee during gout flare. Ordered Acetaminophen PRN and encouraged to utilize. Further plans pending results.    Acute Cerebral Infarction Left MCA Distribution, Likely Embolic Shower from Left Atrium. Follow-up with Dr. Garcia at [facility] in 4-6 weeks from hospitalization. 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 Started on Warfarin for atrial fibrillation. Plavix discontinued due to falls/hemorrhage risk and irreversibility. Continue Pravastatin as ordered. Physical and Occupational Therapy ordered for  deconditioning    Electronically signed by  GRISELDA Wyatt CNP
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
## 2017-04-04 NOTE — Clinical Note
Blake KAPOOR Hope this find you well and hopefully you are not reading this during your time away. When you are back next week, could you see if Charlie would be appropriate for a L knee corticosteroid injection. Sounds pretty typical OA exacerbated by therapies. I did not order any X rays, but can. He had an acute gout flare in R knee during recent hospitalization tx with steroids and colchicine (intersetingly, no uric acid level to confirm). He says left knee pain is different  - is subacute pain now worse with therapy. He's at [facility] in [place]. Rosangela Santos is his PCP out there. Thanks! Yasmin

## 2017-04-06 NOTE — TELEPHONE ENCOUNTER
Received report from staff patient complaining of 9/10 left knee pain. Red, swollen and warm. States it feels the same as when he recently had gout in his right knee.     Differential includes gout flare versus osteoarthritis versus septic arthritis versus bursitis    Orders:  Colchicine 1.2 mg x 1. 2 hours later, Colchicine 0.6 mg x 1.  Uric Acid level on 4/10/17  Update NP on 4/7/17 if no improvement in pain.
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
## 2017-04-10 NOTE — PROGRESS NOTES
[place] [facility]    Chief Complaint   Patient presents with     RECHECK     HPI:    Charlie Hayes is a 89 year old  (2/13/1927), who is being seen today for an episodic care visit at [facility].Today's concern is:    Acute Pain of Left Knee. Started complaining of left knee pain around 4/1/17. Initially thought to be secondary to osteoarthritis given symptom description. On 4/6/17, received telephone call from staff patient was complaining of 9/10 left knee pain and felt like recent gout flare in right knee. Red, swollen and warm. Ordered Colchicine with some improvement in pain. Today, patient reports no improvement in pain since initiation of Acetaminophen 4/3/17. Might be a little better since Colchicine, but is still there with activity. No pain with movement. Using EZ stand to transfer from chair. Denies injury to left knee. Uric Acid level 3.6 on 4/10/17.     Cognitive Impairment. Evaluated by Occupational Therapy and found to have a CPT score of 4.2/5.6. BIMS Score 10/15.     Hypertension. Upon review of blood pressure over the past 124-174. Diastolic range 66-99.     Localized Edema. Noted to have 1+ edema in LLE. Denies pain except in left knee.    ALLERGIES: Keflex [cephalexin monohydrate]; Ibuprin [ibuprofen micronized]; Indocin [indometacin sodium]; and Nuts  Past Medical, Surgical, Family and Social History reviewed and updated in [facility].    Current Outpatient Prescriptions   Medication Sig Dispense Refill     HYDROCHLOROTHIAZIDE PO Take 25 mg by mouth daily       ACETAMINOPHEN PO Take 1,000 mg by mouth 3 times daily        allopurinol (ZYLOPRIM) 100 MG tablet Take 100 mg by mouth daily       atenolol (TENORMIN) 25 MG tablet Take 25 mg by mouth daily       losartan (COZAAR) 25 MG tablet Take 25 mg by mouth daily       Calcium Carb-Cholecalciferol (CALCIUM 600 + D PO) Take 1 capsule by mouth daily       Multiple Vitamins-Minerals (MULTIVITAMIN PO) Take 1 tablet by  mouth daily       pravastatin (PRAVACHOL) 20 MG tablet Take 1 tablet (20 mg) by mouth daily (Due for Cholesterol labs for more refills) 90 tablet 3     carboxymethylcellulose (REFRESH PLUS) 0.5 % SOLN Place 1 drop into both eyes 2 times daily       nitroglycerin (NITROQUICK) 0.4 MG SL tablet Place 1 tablet under the tongue every 5 minutes as needed (1 tablet every 5 minutes as needed- Up to 3 per episode.). up to 3 tablets per episode. 25 tablet 3     fish oil-omega-3 fatty acids (FISH OIL) 1000 MG capsule Take 300 mg by mouth daily. 1 capsule  daily.        OMEPRAZOLE 20 MG OR CPDR 1 CAPSULE DAILY       FLAXSEED 2400 mg daily       GLUCOSAMINE HCL 1000 MG OR TABS Take 1,500 mg by mouth daily        WARFARIN SODIUM PO Contact facility for dosing orders         REVIEW OF SYSTEMS:  4 point ROS including Respiratory, CV, GI and , other than that noted in the HPI,  is negative    Physical Exam:  BP (!) 167/91  Pulse 89  Temp 98.6  F (37  C)  Resp 22  Wt 180 lb 6.4 oz (81.8 kg)  SpO2 95%  BMI 28.25 kg/m2  GENERAL APPEARANCE: Alert, in no distress  ENT: Mouth and posterior oropharynx normal, moist mucous membranes  EYES: EOM, conjunctivae, lids, pupils and irises normal  RESP: respiratory effort and palpation of chest normal, lungs clear to auscultation , no respiratory distress  CV: Palpation and auscultation of heart done , regular rate and rhythm, no murmur, rub, or gallop  ABDOMEN: normal bowel sounds, soft, nontender, no hepatosplenomegaly or other masses  M/S: Active movement of bilateral upper and lower extremtiies. AROM LLE < RLE. Left knee slightly tender upon deep palpation. Warm to touch.  SKIN: Inspection of skin and subcutaneous tissue baseline, Palpation of skin and subcutaneous tissue baseline. No swelling or erythema in bilateral knees.   NEURO: Cranial nerves 2-12 are normal tested and grossly at patient's baseline  PSYCH: affect and mood normal    Recent Labs:    Lab Results   Component Value  Date    WBC 5.9 04/11/2017     Lab Results   Component Value Date    RBC 4.01 04/11/2017     Lab Results   Component Value Date    HGB 12.4 04/11/2017     Lab Results   Component Value Date    HCT 38.4 04/11/2017     No components found for: MCT  Lab Results   Component Value Date    MCV 96 04/11/2017     Lab Results   Component Value Date    MCH 30.9 04/11/2017     Lab Results   Component Value Date    MCHC 32.3 04/11/2017     Lab Results   Component Value Date    RDW 13.9 04/11/2017     Lab Results   Component Value Date     04/11/2017     Last Basic Metabolic Panel:  Lab Results   Component Value Date     04/11/2017      Lab Results   Component Value Date    POTASSIUM 4.2 04/11/2017     Lab Results   Component Value Date    CHLORIDE 98 04/11/2017     Lab Results   Component Value Date    QUYEN 9.3 04/11/2017     Lab Results   Component Value Date    CO2 33 04/11/2017     Lab Results   Component Value Date    BUN 16 04/11/2017     Lab Results   Component Value Date    CR 0.79 04/11/2017     Lab Results   Component Value Date    GLC 92 04/11/2017     Assessment/Plan:  Acute Pain of Left Knee. Mild improvement in pain with Colchicine may indicate pseudogout versus gout. Will order x-ray and consult [facility] PA-C for further recommendations. Continue Acetaminophen as ordered. May benefit from a stronger pain medication.     Cognitive Impairment. May be worsened by recent CVA. Cognitive scores indicate patient requires 24 hour supervision. PTA lived with wife and was driving. Would not recommend patient drive given cognitive scores.  and Transitions Coordinator to assist with appropriate discharge planning.     Hypertension. Systolic blood pressure typically > 150 in the morning and < 150 in the afternoon. Based upon readings, will re-initiate PTA Hydrochlorothiazide given slight edema in LLE. Continue Atenolol and Losartan as ordered. Continue to monitor blood pressures daily. Amlodipine  was discontinued during hospitalization.     Localized Edema. Encouraged elevation. Not ambulating as frequently due to pain. Hydrochlorothiazide re-initiated as noted above may also help.    Electronically signed by  GRISELDA Wyatt CNP

## 2017-04-12 PROBLEM — R60.0 LOCALIZED EDEMA: Status: ACTIVE | Noted: 2017-01-01

## 2017-04-12 PROBLEM — R41.89 COGNITIVE IMPAIRMENT: Status: ACTIVE | Noted: 2017-01-01

## 2017-04-13 NOTE — PROGRESS NOTES
[place] [facility]    Chief Complaint   Patient presents with     Nursing Home Acute     HPI:    Charlie Hayes is a 89 year old  (2/13/1927), who is being seen today for an episodic care visit at [facility]. Today's concern is:    Acute Pain of Bilateral Knees. Started complaining of left knee pain around 4/1/17. Initially thought to be secondary to osteoarthritis given symptom description. On 4/6/17, received telephone call from staff patient was complaining of 9/10 left knee pain and felt like recent gout flare in right knee. Red, swollen and warm. Ordered Colchicine with some improvement in pain. Evaluated by Ortho PA-C on 4/10/17 and received steroid injection in left knee. Today, patient reports improvement in left knee pain. Rates at a 4 out of 10 which is tolerable for him. Is now bothered by his right knee. Rates as a 6 or 7 out of 10. Difficult to transfer in therapy. Wonders if he can receive a steroid injection in his right knee.     Hypertension. Hydrochlorothiazide re-initiated 4/10/17 in addition to Atenolol and Losartan. Since that time, systolic blood pressure range 137-166. Diastolic range from .     Anticoagulation for Acute Cerebral Infarction Left MCA Distribution, Likely Embolic Shower from Left Atrium/Paroxysmal Atrial Fibrillation. INR 5.1 on 4/10/17. Warfarin held. Repeat INR 4.3 on 4/11/17. Warfarin held. Today, INR 1.7.     ALLERGIES: Keflex [cephalexin monohydrate]; Ibuprin [ibuprofen micronized]; Indocin [indometacin sodium]; and Nuts  Past Medical, Surgical, Family and Social History reviewed and updated in [facility].    Current Outpatient Prescriptions   Medication Sig Dispense Refill     diclofenac (VOLTAREN) 1 % GEL topical gel Apply 4 g topically 2 times daily as needed for moderate pain       enoxaparin (LOVENOX) 80 MG/0.8ML injection Inject 1 mg/kg Subcutaneous 2 times daily       HYDROCHLOROTHIAZIDE PO Take 25 mg by mouth daily        ACETAMINOPHEN PO Take 1,000 mg by mouth 3 times daily        allopurinol (ZYLOPRIM) 100 MG tablet Take 100 mg by mouth daily       atenolol (TENORMIN) 25 MG tablet Take 25 mg by mouth daily       losartan (COZAAR) 25 MG tablet Take 25 mg by mouth daily       Calcium Carb-Cholecalciferol (CALCIUM 600 + D PO) Take 1 capsule by mouth daily       WARFARIN SODIUM PO Contact facility for dosing orders       Multiple Vitamins-Minerals (MULTIVITAMIN PO) Take 1 tablet by mouth daily       pravastatin (PRAVACHOL) 20 MG tablet Take 1 tablet (20 mg) by mouth daily (Due for Cholesterol labs for more refills) 90 tablet 3     carboxymethylcellulose (REFRESH PLUS) 0.5 % SOLN Place 1 drop into both eyes 2 times daily       nitroglycerin (NITROQUICK) 0.4 MG SL tablet Place 1 tablet under the tongue every 5 minutes as needed (1 tablet every 5 minutes as needed- Up to 3 per episode.). up to 3 tablets per episode. 25 tablet 3     fish oil-omega-3 fatty acids (FISH OIL) 1000 MG capsule Take 300 mg by mouth daily. 1 capsule  daily.        OMEPRAZOLE 20 MG OR CPDR 1 CAPSULE DAILY       FLAXSEED 2400 mg daily       GLUCOSAMINE HCL 1000 MG OR TABS Take 1,500 mg by mouth daily        Medications reviewed:  Medications reconciled to facility chart and changes were made to reflect current medications as identified as above med list. Below are the changes that were made:   Medications stopped since last EPIC medication reconciliation:   There are no discontinued medications.    Medications started since last [facility] medication reconciliation:  No orders of the defined types were placed in this encounter.    REVIEW OF SYSTEMS:  4 point ROS including Respiratory, CV, GI and , other than that noted in the HPI,  is negative    Physical Exam:  BP (!) 156/101  Pulse 66  Temp 97.7  F (36.5  C)  Resp 20  SpO2 95%  GENERAL APPEARANCE: Alert, in no distress  ENT: Mouth and posterior oropharynx normal, moist mucous membranes  EYES: EOM, conjunctivae,  lids, pupils and irises normal  RESP: respiratory effort and palpation of chest normal, lungs clear to auscultation , no respiratory distress  CV: Palpation and auscultation of heart done , regular rate and rhythm, no murmur, rub, or gallop  ABDOMEN: normal bowel sounds, soft, nontender, no hepatosplenomegaly or other masses  M/S: Active movement of bilateral upper and lower extremtiies. AROM LLE > RLE.   SKIN: Inspection of skin and subcutaneous tissue baseline, Palpation of skin and subcutaneous tissue baseline. No swelling or erythema in bilateral knees.   NEURO: Cranial nerves 2-12 are normal tested and grossly at patient's baseline  PSYCH: affect and mood normal    Recent Labs:    Lab Results   Component Value Date    WBC 5.9 04/11/2017     Lab Results   Component Value Date    RBC 4.01 04/11/2017     Lab Results   Component Value Date    HGB 12.4 04/11/2017     Lab Results   Component Value Date    HCT 38.4 04/11/2017     No components found for: MCT  Lab Results   Component Value Date    MCV 96 04/11/2017     Lab Results   Component Value Date    MCH 30.9 04/11/2017     Lab Results   Component Value Date    MCHC 32.3 04/11/2017     Lab Results   Component Value Date    RDW 13.9 04/11/2017     Lab Results   Component Value Date     04/11/2017     Last Basic Metabolic Panel:  Lab Results   Component Value Date     04/11/2017      Lab Results   Component Value Date    POTASSIUM 4.2 04/11/2017     Lab Results   Component Value Date    CHLORIDE 98 04/11/2017     Lab Results   Component Value Date    QUYEN 9.3 04/11/2017     Lab Results   Component Value Date    CO2 33 04/11/2017     Lab Results   Component Value Date    BUN 16 04/11/2017     Lab Results   Component Value Date    CR 0.79 04/11/2017     Lab Results   Component Value Date    GLC 92 04/11/2017     Assessment/Plan:  Acute Pain of Bilateral Knees. Treated for gout flare of right knee during hospitalization. Left knee x-ray revealed pseudogout  and degenerative joint disease. Improved with Colchicine and steroid injection. Discussed with Ortho PASHIVANI. Will trial Voltaren gel to knees BID PRN. If no improvement in pain, consider increasing dose, scheduling, increasing pain medications, further imaging and/or steroid injection in the right knee. Continue Acetaminophen as ordered. Physical and Occupational Therapy ordered for deconditioning.    Hypertension. Amlodipine discontinued during hospitalization. Most blood pressures <150/90 since re-initiation of Hydrochlorothiazide on 4/10/17. Blood pressure may be elected secondary to pain. Continue Atenolol and Losartan as ordered. Monitor blood pressures daily.     Anticoagulation for Acute Cerebral Infarction Left MCA Distribution, Likely Embolic Shower from Left Atrium/Paroxysmal Atrial Fibrillation. Ordered Warfarin 2 mg PO QD with repeat INR on 4/17/17. Due to subtherapeutic INR, will re-initiate Enoxaparin injections. Discontinue when INR > 2.    Electronically signed by  GRISELDA Wyatt CNP

## 2017-04-17 NOTE — PROGRESS NOTES
[place] [facility]    Chief Complaint   Patient presents with     Nursing Home Acute     HPI:    Charlie Hayes is a 89 year old  (2/13/1927), who is being seen today for an episodic care visit at [facility]. Today's concern is:    Acute Pain of Bilateral Knees. Continues to complain of pain in bilateral knees, R>L. Worse when twisting knee. Questions if right knee can be injected with a steroid as it helped his left knee. Has not utilized Voltaren gel PRN which was ordered 4/13/17.     Anticoagulation for Acute Cerebral Infarction Left MCA Distribution, Likely Embolic Shower from Left Atrium/Paroxysmal Atrial Fibrillation. INR 1.4 today. Previous INR 1.7 on 4/13/17. Taking Warfarin 2 mg PO QD.     Hypertension. Upon review of blood pressure since previous visit 4/13/17, systolic range 138-169 with an episode of 181. Diastolic range from 84-89 with an episode of 109.    ALLERGIES: Keflex [cephalexin monohydrate]; Ibuprin [ibuprofen micronized]; Indocin [indometacin sodium]; and Nuts  Past Medical, Surgical, Family and Social History reviewed and updated in [facility].    Current Outpatient Prescriptions   Medication Sig Dispense Refill     diclofenac (VOLTAREN) 1 % GEL topical gel Apply 4 g topically 2 times daily as needed for moderate pain       enoxaparin (LOVENOX) 80 MG/0.8ML injection Inject 1 mg/kg Subcutaneous 2 times daily       HYDROCHLOROTHIAZIDE PO Take 25 mg by mouth daily       ACETAMINOPHEN PO Take 1,000 mg by mouth 3 times daily        allopurinol (ZYLOPRIM) 100 MG tablet Take 100 mg by mouth daily       atenolol (TENORMIN) 25 MG tablet Take 25 mg by mouth daily       losartan (COZAAR) 25 MG tablet Take 25 mg by mouth daily       Calcium Carb-Cholecalciferol (CALCIUM 600 + D PO) Take 1 capsule by mouth daily       WARFARIN SODIUM PO Contact facility for dosing orders       Multiple Vitamins-Minerals (MULTIVITAMIN PO) Take 1 tablet by mouth daily       pravastatin  (PRAVACHOL) 20 MG tablet Take 1 tablet (20 mg) by mouth daily (Due for Cholesterol labs for more refills) 90 tablet 3     carboxymethylcellulose (REFRESH PLUS) 0.5 % SOLN Place 1 drop into both eyes 2 times daily       nitroglycerin (NITROQUICK) 0.4 MG SL tablet Place 1 tablet under the tongue every 5 minutes as needed (1 tablet every 5 minutes as needed- Up to 3 per episode.). up to 3 tablets per episode. 25 tablet 3     fish oil-omega-3 fatty acids (FISH OIL) 1000 MG capsule Take 300 mg by mouth daily. 1 capsule  daily.        OMEPRAZOLE 20 MG OR CPDR 1 CAPSULE DAILY       FLAXSEED 2400 mg daily       GLUCOSAMINE HCL 1000 MG OR TABS Take 1,500 mg by mouth daily        Medications reviewed:  Medications reconciled to facility chart and changes were made to reflect current medications as identified as above med list. Below are the changes that were made:   Medications stopped since last EPIC medication reconciliation:   There are no discontinued medications.    Medications started since last [facility] medication reconciliation:  No orders of the defined types were placed in this encounter.    REVIEW OF SYSTEMS:  4 point ROS including Respiratory, CV, GI and , other than that noted in the HPI,  is negative    Physical Exam:  /85  Pulse 71  Temp 97.5  F (36.4  C)  Resp 20  SpO2 97%  GENERAL APPEARANCE: Alert, in no distress  ENT: Mouth and posterior oropharynx normal, moist mucous membranes  EYES: EOM, conjunctivae, lids, pupils and irises normal  RESP: respiratory effort and palpation of chest normal, lungs clear to auscultation , no respiratory distress  CV: Palpation and auscultation of heart done , regular rate and rhythm, no murmur, rub, or gallop  ABDOMEN: normal bowel sounds, soft, nontender, no hepatosplenomegaly or other masses  M/S: Active movement of bilateral upper and lower extremtiies. AROM LLE > RLE.   SKIN: Inspection of skin and subcutaneous tissue baseline, Palpation of skin and subcutaneous  tissue baseline. No swelling or erythema in bilateral knees.   NEURO: Cranial nerves 2-12 are normal tested and grossly at patient's baseline  PSYCH: affect and mood normal     Recent Labs:    Lab Results   Component Value Date    WBC 5.9 04/11/2017     Lab Results   Component Value Date    RBC 4.01 04/11/2017     Lab Results   Component Value Date    HGB 12.4 04/11/2017     Lab Results   Component Value Date    HCT 38.4 04/11/2017     No components found for: MCT  Lab Results   Component Value Date    MCV 96 04/11/2017     Lab Results   Component Value Date    MCH 30.9 04/11/2017     Lab Results   Component Value Date    MCHC 32.3 04/11/2017     Lab Results   Component Value Date    RDW 13.9 04/11/2017     Lab Results   Component Value Date     04/11/2017     Last Basic Metabolic Panel:  Lab Results   Component Value Date     04/11/2017      Lab Results   Component Value Date    POTASSIUM 4.2 04/11/2017     Lab Results   Component Value Date    CHLORIDE 98 04/11/2017     Lab Results   Component Value Date    QUYEN 9.3 04/11/2017     Lab Results   Component Value Date    CO2 33 04/11/2017     Lab Results   Component Value Date    BUN 16 04/11/2017     Lab Results   Component Value Date    CR 0.79 04/11/2017     Lab Results   Component Value Date    GLC 92 04/11/2017     Assessment/Plan:  Acute Pain of Bilateral Knees.Treated for gout flare of right knee during hospitalization. Left knee x-ray revealed pseudogout and degenerative joint disease. Improved with Colchicine and steroid injection. Discussed with Ortho PA-C. Trialed Voltaren gel PRN, but since patient did not utilize, will scheduled Voltaren gel BID and BID PRN. Continue Acetaminophen as ordered. Physical and Occupational Therapy continue to work with patient.    Anticoagulation for Acute Cerebral Infarction Left MCA Distribution, Likely Embolic Shower from Left Atrium/Paroxysmal Atrial Fibrillation. Was stable on Warfarin 2.5 mg PO QD soon after  admission to TCU, but then became supratherapeutic. May be related to knee steroid injection around the same time. Will increase Warfarin to 3 mg today, tomorrow and Warfarin 2 mg on Wednesday. Repeat INR on 4/20/17.    Hypertension. Amlodipine discontinued during hospitalization. Hydrochlorothiazide re-initiated 4/10/17. Blood pressures continue to be elevated and may be elevated secondary to pain. Continue Atenolol and Losartan as ordered. Monitor blood pressures daily. If blood pressures remain high after scheduling Voltaren gel for pain, will need adjustment in antihypertensive medications.    Electronically signed by  GRISELDA Wyatt CNP

## 2017-04-20 NOTE — PROGRESS NOTES
[place] [facility]    Chief Complaint   Patient presents with     Nursing Home Acute     HPI:    Charlie Hayes is a 89 year old  (2/13/1927), who is being seen today for an episodic care visit at [facility]. Today's concern is:    Acute Pain of Bilateral Knees. Has some pain in the right knee. Is pleased with Voltaren gel for pain control. Requires 1 assist for transfers with staff as compared to EZ stand last week. Evaluated by [facility] PA-C and received steroid injection in the right knee today.     Anticoagulation for Acute Cerebral Infarction Left MCA Distribution, Likely Embolic Shower from Left Atrium/Paroxysmal Atrial Fibrillation. INR 2.1 today. Previous INR 1.4 on 4/17/17. Taking Warfarin 3 mg on 4/17/17 & 4/18/17 and 2 mg on 4/19/17.     Hypertension. Upon review of blood pressures since previous visit 4/17/17, systolic range 125-172 with an episode of 192. Diastolic range from 80-96.    ALLERGIES: Keflex [cephalexin monohydrate]; Ibuprin [ibuprofen micronized]; Indocin [indometacin sodium]; and Nuts  Past Medical, Surgical, Family and Social History reviewed and [facility].    Current Outpatient Prescriptions   Medication Sig Dispense Refill     diclofenac (VOLTAREN) 1 % GEL topical gel Apply 4 g topically 2 times daily And BID PRN       HYDROCHLOROTHIAZIDE PO Take 25 mg by mouth daily       ACETAMINOPHEN PO Take 1,000 mg by mouth 3 times daily        allopurinol (ZYLOPRIM) 100 MG tablet Take 100 mg by mouth daily       atenolol (TENORMIN) 25 MG tablet Take 25 mg by mouth daily       losartan (COZAAR) 25 MG tablet Take 25 mg by mouth daily       Calcium Carb-Cholecalciferol (CALCIUM 600 + D PO) Take 1 capsule by mouth daily       WARFARIN SODIUM PO Contact facility for dosing orders       Multiple Vitamins-Minerals (MULTIVITAMIN PO) Take 1 tablet by mouth daily       pravastatin (PRAVACHOL) 20 MG tablet Take 1 tablet (20 mg) by mouth daily (Due for Cholesterol  labs for more refills) 90 tablet 3     carboxymethylcellulose (REFRESH PLUS) 0.5 % SOLN Place 1 drop into both eyes 2 times daily       nitroglycerin (NITROQUICK) 0.4 MG SL tablet Place 1 tablet under the tongue every 5 minutes as needed (1 tablet every 5 minutes as needed- Up to 3 per episode.). up to 3 tablets per episode. 25 tablet 3     fish oil-omega-3 fatty acids (FISH OIL) 1000 MG capsule Take 300 mg by mouth daily. 1 capsule  daily.        OMEPRAZOLE 20 MG OR CPDR 1 CAPSULE DAILY       FLAXSEED 2400 mg daily       GLUCOSAMINE HCL 1000 MG OR TABS Take 1,500 mg by mouth daily        Medications reviewed:  Medications reconciled to facility chart and changes were made to reflect current medications as identified as above med list. Below are the changes that were made:   Medications stopped since last EPIC medication reconciliation:   There are no discontinued medications.    Medications started since last [facility] medication reconciliation:  No orders of the defined types were placed in this encounter.      REVIEW OF SYSTEMS:  4 point ROS including Respiratory, CV, GI and , other than that noted in the HPI,  is negative    Physical Exam:  BP (!) 172/96  Pulse 67  Temp 97.4  F (36.3  C)  Resp 22  SpO2 94%  GENERAL APPEARANCE: Alert, in no distress  ENT: Mouth and posterior oropharynx normal, moist mucous membranes  EYES: EOM, conjunctivae, lids, pupils and irises normal  RESP: respiratory effort and palpation of chest normal, lungs clear to auscultation , no respiratory distress  CV: Palpation and auscultation of heart done, irregular rhythm. No murmur, rub, or gallop  ABDOMEN: normal bowel sounds, soft, nontender, no hepatosplenomegaly or other masses  M/S: Active movement of bilateral upper and lower extremtiies.    SKIN: Inspection of skin and subcutaneous tissue baseline, Palpation of skin and subcutaneous tissue baseline. No swelling or erythema in bilateral knees.   NEURO: Cranial nerves 2-12 are normal  tested and grossly at patient's baseline  PSYCH: affect and mood normal    Recent Labs:    Lab Results   Component Value Date    WBC 5.9 04/11/2017     Lab Results   Component Value Date    RBC 4.01 04/11/2017     Lab Results   Component Value Date    HGB 12.4 04/11/2017     Lab Results   Component Value Date    HCT 38.4 04/11/2017     No components found for: MCT  Lab Results   Component Value Date    MCV 96 04/11/2017     Lab Results   Component Value Date    MCH 30.9 04/11/2017     Lab Results   Component Value Date    MCHC 32.3 04/11/2017     Lab Results   Component Value Date    RDW 13.9 04/11/2017     Lab Results   Component Value Date     04/11/2017     Last Basic Metabolic Panel:  Lab Results   Component Value Date     04/11/2017      Lab Results   Component Value Date    POTASSIUM 4.2 04/11/2017     Lab Results   Component Value Date    CHLORIDE 98 04/11/2017     Lab Results   Component Value Date    QUYEN 9.3 04/11/2017     Lab Results   Component Value Date    CO2 33 04/11/2017     Lab Results   Component Value Date    BUN 16 04/11/2017     Lab Results   Component Value Date    CR 0.79 04/11/2017     Lab Results   Component Value Date    GLC 92 04/11/2017     Assessment/Plan:  Acute Pain of Bilateral Knees. Treated for gout flare of right knee during hospitalization. Left knee x-ray revealed pseudogout and degenerative joint disease. Improved with Colchicine 4/6/17 and left knee steroid injection 4/10/17. Right knee injected by [facility] PA-C today. Continue Acetaminophen and Voltaren gel as ordered. Physical and Occupational Therapy ordered for deconditioning.    Anticoagulation for Acute Cerebral Infarction Left MCA Distribution, Likely Embolic Shower from Left Atrium/Paroxysmal Atrial Fibrillation. INR therapeutic. Discontinued Enoxaparin injections. Ordered Warfarin 2.5 mg PO QD. Recheck INR on 4/24/17.     Hypertension. Most blood pressures elevated in the morning, but at goal of <140/90  after receiving antihypertensive medications. Hesitant to decrease blood pressure too much due to risk of hypotensive and falls. Blood pressure may also be elevated due to pain and should improve with recent steroid injection. Continue Atenolol, Hydrochlorothiazide and Losartan as ordered for now. Previously on Amlodipine which was discontinued during hospitalization.     Electronically signed by  GRISELDA Wyatt CNP
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
## 2017-04-24 NOTE — PROGRESS NOTES
"[facility]    Chief Complaint   Patient presents with     RECHECK     HPI:    Charlie Hayes is a 89 year old  (2/13/1927), who is being seen today for an episodic care visit at [facility].Today's concern is:    Acute Pain of Bilateral Knees with History of Degenerative Joint Disease. Received steroid injection to the right knee on 4/20/17 and to the left knee 4/10/17. Since that time, reports some improvement in pain although initially responds \"I'm not sure\" when asked if pain was better since steroid injection. Complains of knee pain with transfers. Uncertain which knee is worse, if either.     Anticoagulation for Acute Cerebral Infarction Left MCA Distribution, Likely Embolic Shower from Left Atrium/Paroxysmal Atrial Fibrillation. INR 2.2 today. Previous INR 2.1 on 4/20/17. Taking Warfarin 2.5 mg PO QD.    Physical Deconditioning. Secondary to CVA. Participating in Physical and Occupational Therapy. Ambulating 75-80 feet with FWW and CGA with wheelchair to follow. Minimal-to-moderate assistance with sit-to-stand. Transfers with SBA. Minimal assistance with upper extremity activities. Minimal-to-maximum  Assistance with lower extremity activities. Toilet transfers with CGA. Able to stand 4.5 minutes.    ALLERGIES: Keflex [cephalexin monohydrate]; Ibuprin [ibuprofen micronized]; Indocin [indometacin sodium]; and Nuts  Past Medical, Surgical, Family and Social History reviewed and updated in EPIC.    Current Outpatient Prescriptions   Medication Sig Dispense Refill     diclofenac (VOLTAREN) 1 % GEL topical gel Apply 4 g topically 2 times daily And BID PRN       HYDROCHLOROTHIAZIDE PO Take 25 mg by mouth daily       ACETAMINOPHEN PO Take 1,000 mg by mouth 3 times daily        allopurinol (ZYLOPRIM) 100 MG tablet Take 100 mg by mouth daily       atenolol (TENORMIN) 25 MG tablet Take 25 mg by mouth daily       losartan (COZAAR) 25 MG tablet Take 25 mg by mouth daily       " "Calcium Carb-Cholecalciferol (CALCIUM 600 + D PO) Take 1 capsule by mouth daily       WARFARIN SODIUM PO Take 2.5 mg by mouth Contact facility for dosing orders        Multiple Vitamins-Minerals (MULTIVITAMIN PO) Take 1 tablet by mouth daily       pravastatin (PRAVACHOL) 20 MG tablet Take 1 tablet (20 mg) by mouth daily (Due for Cholesterol labs for more refills) 90 tablet 3     carboxymethylcellulose (REFRESH PLUS) 0.5 % SOLN Place 1 drop into both eyes 2 times daily       nitroglycerin (NITROQUICK) 0.4 MG SL tablet Place 1 tablet under the tongue every 5 minutes as needed (1 tablet every 5 minutes as needed- Up to 3 per episode.). up to 3 tablets per episode. 25 tablet 3     fish oil-omega-3 fatty acids (FISH OIL) 1000 MG capsule Take 300 mg by mouth daily. 1 capsule  daily.        OMEPRAZOLE 20 MG OR CPDR 1 CAPSULE DAILY       FLAXSEED 2400 mg daily       GLUCOSAMINE HCL 1000 MG OR TABS Take 1,500 mg by mouth daily          REVIEW OF SYSTEMS:  4 point ROS including Respiratory, CV, GI and , other than that noted in the HPI,  is negative    Physical Exam:  /90  Pulse 69  Temp 97.5  F (36.4  C)  Resp 19  Ht 5' 7\" (1.702 m)  Wt 183 lb 3.2 oz (83.1 kg)  SpO2 94%  BMI 28.69 kg/m2  GENERAL APPEARANCE: Alert, in no distress  ENT: Mouth and posterior oropharynx normal, moist mucous membranes  EYES: EOM, conjunctivae, lids, pupils and irises normal  RESP: respiratory effort and palpation of chest normal, lungs clear to auscultation , no respiratory distress  CV: Palpation and auscultation of heart done, irregular rhythm. No murmur, rub, or gallop  ABDOMEN: normal bowel sounds, soft, nontender, no hepatosplenomegaly or other masses  M/S: Active movement of bilateral upper and lower extremtiies.   SKIN: Inspection of skin and subcutaneous tissue baseline, Palpation of skin and subcutaneous tissue baseline.   NEURO: Cranial nerves 2-12 are normal tested and grossly at patient's baseline  PSYCH: affect and mood " normal    Recent Labs:    Lab Results   Component Value Date    WBC 5.9 04/11/2017     Lab Results   Component Value Date    RBC 4.01 04/11/2017     Lab Results   Component Value Date    HGB 12.4 04/11/2017     Lab Results   Component Value Date    HCT 38.4 04/11/2017     No components found for: MCT  Lab Results   Component Value Date    MCV 96 04/11/2017     Lab Results   Component Value Date    MCH 30.9 04/11/2017     Lab Results   Component Value Date    MCHC 32.3 04/11/2017     Lab Results   Component Value Date    RDW 13.9 04/11/2017     Lab Results   Component Value Date     04/11/2017     Last Basic Metabolic Panel:  Lab Results   Component Value Date     04/11/2017      Lab Results   Component Value Date    POTASSIUM 4.2 04/11/2017     Lab Results   Component Value Date    CHLORIDE 98 04/11/2017     Lab Results   Component Value Date    QUYEN 9.3 04/11/2017     Lab Results   Component Value Date    CO2 33 04/11/2017     Lab Results   Component Value Date    BUN 16 04/11/2017     Lab Results   Component Value Date    CR 0.79 04/11/2017     Lab Results   Component Value Date    GLC 92 04/11/2017     Assessment/Plan:  Acute Pain of Bilateral Knees with History of Degenerative Joint Disease. Treated for gout flare of right knee during hospitalization. Left knee x-ray revealed pseudogout and degenerative joint disease. Improved with Colchicine 4/6/17 and left knee steroid injection 4/10/17. Right knee injected 4/20/17. Continue Acetaminophen and Voltaren gel as ordered. Physical and Occupational Therapy ordered for deconditioning.    Anticoagulation for Acute Cerebral Infarction Left MCA Distribution, Likely Embolic Shower from Left Atrium/Paroxysmal Atrial Fibrillation. INR therapeutic. Continue Warfarin 2.5 mg PO QD as ordered. Next INR on 4/27/17.     Physical Deconditioning. Secondary to CVA. Continue Physical and Occupational Therapy as ordered. Plan is to discharge to Assisted Living with  wife.    Electronically signed by  GRISELDA Wyatt CNP      Face to Face and Medical Necessity Statement for DME Provider visit    Demographic Information on Charlie Hayes:  Gender: male  : 1927  [address] [place] [id number] [phone] (home)     Medical Record: [id number]  Social Security Number: [id number]  Primary Care Provider: Fito Prather  Insurance: Payor: [facility] / Plan: [facility] [employer] BLUE / Product Type: PPO /     HPI:   Charlie Hayes is a 89 year old  (1927), who is being seen today for a face to face provider visit at [facility]; medical necessity statement for DME included. This patient requires the following:  DME ordered:  Mechanical Wheelchair  Size: standard 16 x 16  Corresponding cushion: Yes: At risk for skin breakdown given age and co-morbidities  Standard foot rests: Yes  Elevating leg rests: Yes  Arm rests: Yes: Given degenerative joint disease  Lap tray: No  Does patient use oxygen? No   Able to propel w/c? Yes  Mobility related ADL that are affected in the home: Given acute on chronic knee pain, only able to ambulate short distances. Will need wheelchair for long distances.  The wheelchair is suitable and necessary for use in the patient's home.  Reason why a cane or walker will not meet the patient's needs. (ie: balance, tolerance, level of assistance) Given bilateral acute on chronic knee pain, only able to ambulate short distances. Will need wheelchair for longer distances  The patient has expressed willingness to use the wheelchair in the home and does have the physical and cognitive ability to maneuver the equipment or has a caregiver who is available, willing, and able to provide assistance in the home with the wheelchair.     Medical Necessity Statement   Pt needing above DME with expected length of need of 99  months  due to medical nessisity associated with following diagnosis:     Acute pain of both  knees  Paroxysmal atrial fibrillation (H)  Cerebral infarction due to embolism of left middle cerebral artery (H)  Physical deconditioning  Primary osteoarthritis of both knees      PMH   has a past medical history of AR (allergic rhinitis); Colon polyps, hyperplastic (11/15/94); High cholesterol; HTN (hypertension); Lipoma; Macular degeneration; MI (myocardial infarction) (H) (05/01); Nasal fracture (1969); Nonsenile cataract; ALMITA (obstructive sleep apnea) (07/01); and S/P amputation of finger through MCP joint. He also has no past medical history of Amblyopia; Arthritis; Diabetes (H); Diabetic retinopathy (H); Glaucoma; Malignant hyperthermia; Retinal detachment; Strabismus; or Uveitis.  CURRENT MEDICATIONS  Current Outpatient Prescriptions   Medication Sig Dispense Refill     diclofenac (VOLTAREN) 1 % GEL topical gel Apply 4 g topically 2 times daily And BID PRN       HYDROCHLOROTHIAZIDE PO Take 25 mg by mouth daily       ACETAMINOPHEN PO Take 1,000 mg by mouth 3 times daily        allopurinol (ZYLOPRIM) 100 MG tablet Take 100 mg by mouth daily       atenolol (TENORMIN) 25 MG tablet Take 25 mg by mouth daily       losartan (COZAAR) 25 MG tablet Take 25 mg by mouth daily       Calcium Carb-Cholecalciferol (CALCIUM 600 + D PO) Take 1 capsule by mouth daily       WARFARIN SODIUM PO Take 2.5 mg by mouth Contact facility for dosing orders        Multiple Vitamins-Minerals (MULTIVITAMIN PO) Take 1 tablet by mouth daily       pravastatin (PRAVACHOL) 20 MG tablet Take 1 tablet (20 mg) by mouth daily (Due for Cholesterol labs for more refills) 90 tablet 3     carboxymethylcellulose (REFRESH PLUS) 0.5 % SOLN Place 1 drop into both eyes 2 times daily       nitroglycerin (NITROQUICK) 0.4 MG SL tablet Place 1 tablet under the tongue every 5 minutes as needed (1 tablet every 5 minutes as needed- Up to 3 per episode.). up to 3 tablets per episode. 25 tablet 3     fish oil-omega-3 fatty acids (FISH OIL) 1000 MG capsule Take 300  "mg by mouth daily. 1 capsule  daily.        OMEPRAZOLE 20 MG OR CPDR 1 CAPSULE DAILY       FLAXSEED 2400 mg daily       GLUCOSAMINE HCL 1000 MG OR TABS Take 1,500 mg by mouth daily        ROS:4 point ROS including Respiratory, CV, GI and , other than that noted in the HPI,  is negative     EXAM  Vitals: /90  Pulse 69  Temp 97.5  F (36.4  C)  Resp 19  Ht 5' 7\" (1.702 m)  Wt 183 lb 3.2 oz (83.1 kg)  SpO2 94%  BMI 28.69 kg/m2;BMI= Body mass index is 28.69 kg/(m^2).  Constitutional: healthy, alert and no distress   Respiratory: negative, Percussion normal. Good diaphragmatic excursion. Lungs clear  Abdomen: Abdomen soft, non-tender. BS normal. No masses, organomegaly  NEURO: Gait normal. Reflexes normal and symmetric. Sensation grossly WNL.  JOINT/EXTREMITIES: extremities normal- no gross deformities noted     ASSESSMENT/PLAN:  1. Acute pain of both knees    2. Paroxysmal atrial fibrillation (H)    3. Cerebral infarction due to embolism of left middle cerebral artery (H)    4. Physical deconditioning    5. Primary osteoarthritis of both knees      Orders:  1. Facility staff/TC to contact DME company to get their order form for provider to fill out    ELECTRONICALLY SIGNED BY KIM CERTIFIED PROVIDER:  GRISELDA Wyatt CNP   NPI: [id number]  [facility]  [address] [place] [address], [address]  [place] [address]            "

## 2017-04-27 NOTE — PROGRESS NOTES
[place] [facility] DISCHARGE SUMMARY    PATIENT'S NAME: Charlie Hayes  YOB: 1927  MEDICAL RECORD NUMBER:  [id number]    PRIMARY CARE PROVIDER AND CLINIC RESPONSIBLE AFTER TRANSFER: Fito Prather [facility] [address] / [facility] [place], [place]    CODE STATUS/ADVANCE DIRECTIVES DISCUSSION:   DNR / DNI    Allergies   Allergen Reactions     Keflex [Cephalexin Monohydrate] Anaphylaxis     Ibuprin [Ibuprofen Micronized]      Indocin [Indometacin Sodium]      Nuts      TRANSFERRING PROVIDERS: GRISELDA Wyatt CNP, Yasmin Esparza MD  DATE OF SNF ADMISSION:  March / 28 / 2017  DATE OF SNF (anticipated) DISCHARGE: May / 03 / 2017  DISCHARGE DISPOSITION: Will discharge to [facility] and be followed by [facility]   Nursing Facility: [facility] stay 3/21/17 to 3/28/17.     Condition on Discharge:  Stable.  Cognitive Scores: BIMS 11/15 and CPT 4.2/5.6    Equipment: walker and wheelchair    DISCHARGE DIAGNOSIS:   1. Cerebral infarction due to embolism of left middle cerebral artery (H)    2. Paroxysmal atrial fibrillation (H)    3. Cognitive impairment    4. Localized edema    5. Gout, unspecified cause, unspecified chronicity, unspecified site    6. History of MI (myocardial infarction)    7. Benign essential hypertension    8. Acute pain of both knees    9. Osteoarthritis of both knees, unspecified osteoarthritis type    10. ALMITA (obstructive sleep apnea)    11. Gastroesophageal reflux disease, esophagitis presence not specified      HPI Nursing Facility Course:  This is a 89-year-old male, with a past medical history significant for GERD, hypertension, hyperlipidemia, previous MI, ALMITA and macular degeneration, who was admitted to [facility] with lower extremity weakness and gait disturbance. Initial head CT unremarkable. Developed increased right-sided weakness overnight  "and a brain MRI revealed \"Small foci of highly restricted diffusion at the left frontoparietal junction compatible with acute nonhemorrhagic infarcts\". 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 Diagnosed with an acute cerebral infarction of the left MCA distribution, likely embolic shower from left atrium. Neurology consulted. Initiated Plavix. Noted to have runs of atrial fibrillation. Echocardiogram revealed EF 55-60%. Cardiology consulted and started on Warfarin. Plavix discontinued due to falls/hemorrhage risk and irreversibility. Treated for gout of the right knee with Colchicine and Solu-Medrol. A TCU stay was recommended for ongoing physical rehabilitation.     During TCU stay, received Physical and Occupational Therapy. Ambulating 75 feet with FWW and [id number] with wheelchair to follow. Worked on functional distance and will require wheelchair when pain is limiting ability to ambulate. Sit-to-stand transfers with CGA. Bed mobility SBA-to-CGA. Recommend grab bars for increased independence with bed mobility. Poor retention of information and poor recall. SBA with cues for technique for upper extremity activities. Maximum assistance with socks and minimum assistance with shoes. CGA for toileting. SBA for grooming/hygiene while sitting.      Acute Cerebral Infarction Left MCA Distribution, Likely Embolic Shower from Left Atrium. Follow-up with Dr. Garcia at [facility] in 4-6 weeks from hospital discharge. 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 Started on Warfarin for atrial fibrillation. Plavix discontinued due to falls/hemorrhage risk and irreversibility. Continue Pravastatin as ordered. Home Physical and Occupational Therapy ordered for deconditioning.     Paroxysmal Atrial Fibrillation. Follow-up with Cardiology as scheduled. Cardiology consulted during " hospitalization and Warfarin initiated. INR 2.2 on 4/24/17.Previous INR 2.1 mg on 4/20/17. Taking Warfarin 2.5 mg with next INR due 5/4/17. Continue Atenolol as ordered.     Gout of the Right Knee with Acute Pain of Bilateral Knees with History of Degenerative Joint Disease. Treated with Colchicine and Solu-Medrol during hospitalization for gout flare. Complained of left knee pain. Improved with Colchicine 4/6/17 and left knee steroid injection 4/10/17. Left knee x-ray revealed pseudogout and degenerative joint disease on 4/10/17. Then complained of right knee pain. Right knee injected 4/20/17. Continue Acetaminophen and Voltaren gel as ordered. Also on Glucosamine and Calcium-Vitamin D. Home Physical and Occupational Therapy ordered for deconditioning..     Hyperlipidemia/Hypertension/History of MI. Upon review of blood pressure over the past week, systolic range from 116-181. Diastolic range from . Most blood pressures elevated in the morning, but at goal of <140/90 after receiving antihypertensive medications. Hesitant to decrease blood pressure too much due to risk of falls. Blood pressure may be elevated due to knee pain. Continue Atenolol, Fish Oil, Pravastatin, Nitroglycerin, Hydrochlorothiazide and Losartan as ordered. Previously on Amlodipine which was discontinued during hospitalization.      Obstructive Sleep Apnea. Quit using CPAP.      Gastroesophageal Reflux Disease. Continue Omeprazole as ordered.    PAST MEDICAL HISTORY:  has a past medical history of AR (allergic rhinitis); Colon polyps, hyperplastic (11/15/94); High cholesterol; HTN (hypertension); Lipoma; Macular degeneration; MI (myocardial infarction) (H) (05/01); Nasal fracture (1969); Nonsenile cataract; ALMITA (obstructive sleep apnea) (07/01); and S/P amputation of finger through MCP joint. He also has no past medical history of Amblyopia; Arthritis; Diabetes (H); Diabetic retinopathy (H); Glaucoma; Malignant hyperthermia; Retinal  detachment; Strabismus; or Uveitis.    DISCHARGE MEDICATIONS:  Current Outpatient Prescriptions   Medication Sig Dispense Refill     diclofenac (VOLTAREN) 1 % GEL topical gel Apply 4 g topically 2 times daily And BID PRN       HYDROCHLOROTHIAZIDE PO Take 25 mg by mouth daily       ACETAMINOPHEN PO Take 1,000 mg by mouth 3 times daily        allopurinol (ZYLOPRIM) 100 MG tablet Take 100 mg by mouth daily       atenolol (TENORMIN) 25 MG tablet Take 25 mg by mouth daily       losartan (COZAAR) 25 MG tablet Take 25 mg by mouth daily       Calcium Carb-Cholecalciferol (CALCIUM 600 + D PO) Take 1 capsule by mouth daily       WARFARIN SODIUM PO Take 2.5 mg by mouth Contact facility for dosing orders        Multiple Vitamins-Minerals (MULTIVITAMIN PO) Take 1 tablet by mouth daily       pravastatin (PRAVACHOL) 20 MG tablet Take 1 tablet (20 mg) by mouth daily (Due for Cholesterol labs for more refills) 90 tablet 3     carboxymethylcellulose (REFRESH PLUS) 0.5 % SOLN Place 1 drop into both eyes 2 times daily       nitroglycerin (NITROQUICK) 0.4 MG SL tablet Place 1 tablet under the tongue every 5 minutes as needed (1 tablet every 5 minutes as needed- Up to 3 per episode.). up to 3 tablets per episode. 25 tablet 3     fish oil-omega-3 fatty acids (FISH OIL) 1000 MG capsule Take 300 mg by mouth daily. 1 capsule  daily.        OMEPRAZOLE 20 MG OR CPDR 1 CAPSULE DAILY       FLAXSEED 2400 mg daily       GLUCOSAMINE HCL 1000 MG OR TABS Take 1,500 mg by mouth daily        MEDICATION CHANGES/RATIONALE: See above  Controlled medications sent with patient: not applicable/none     ROS:    4 point ROS including Respiratory, CV, GI and , other than that noted in the HPI,  is negative    Physical Exam:   Vitals: BP (!) 161/96  Pulse 77  Temp 97.6  F (36.4  C)  Resp 20  Wt 183 lb 3.2 oz (83.1 kg)  SpO2 95%  BMI 28.69 kg/m2  BMI= Body mass index is 28.69 kg/(m^2).  GENERAL APPEARANCE: Alert, in no distress  ENT: Mouth and posterior  oropharynx normal, moist mucous membranes  EYES: EOM, conjunctivae, lids, pupils and irises normal  RESP: respiratory effort and palpation of chest normal, lungs clear to auscultation , no respiratory distress  CV: Palpation and auscultation of heart done, irregular rhythm. No murmur, rub, or gallop  ABDOMEN: normal bowel sounds, soft, nontender, no hepatosplenomegaly or other masses  M/S: Active movement of bilateral upper and lower extremtiies.   SKIN: Inspection of skin and subcutaneous tissue baseline, Palpation of skin and subcutaneous tissue baseline.   NEURO: Cranial nerves 2-12 are normal tested and grossly at patient's baseline  PSYCH: affect and mood normal    DISCHARGE PLAN:  Occupational Therapy, Physical Therapy, Registered Nurse, Home Health Aide and From:  [facility]  Patient instructed to follow-up with:  PCP in 7 days      [facility] scheduled appointments:  Future Appointments  Date Time Provider Department Center   6/5/2017 9:00 AM Fito Prather MD [facility]   11/6/2017 10:00 AM Vincent De Luna MD [facility]     MT referral needed and placed by this provider: No    Pending labs: INR 5/4/17    SNF Labs:  Lab Results   Component Value Date    WBC 5.9 04/11/2017     Lab Results   Component Value Date    RBC 4.01 04/11/2017     Lab Results   Component Value Date    HGB 12.4 04/11/2017     Lab Results   Component Value Date    HCT 38.4 04/11/2017     No components found for: MCT  Lab Results   Component Value Date    MCV 96 04/11/2017     Lab Results   Component Value Date    MCH 30.9 04/11/2017     Lab Results   Component Value Date    MCHC 32.3 04/11/2017     Lab Results   Component Value Date    RDW 13.9 04/11/2017     Lab Results   Component Value Date     04/11/2017     Last Basic Metabolic Panel:  Lab Results   Component Value Date     04/11/2017      Lab Results   Component Value Date    POTASSIUM 4.2 04/11/2017     Lab Results   Component Value  Date    CHLORIDE 98 04/11/2017     Lab Results   Component Value Date    QUYEN 9.3 04/11/2017     Lab Results   Component Value Date    CO2 33 04/11/2017     Lab Results   Component Value Date    BUN 16 04/11/2017     Lab Results   Component Value Date    CR 0.79 04/11/2017     Lab Results   Component Value Date    GLC 92 04/11/2017     Discharge Treatments: See facility discharge orders    TOTAL DISCHARGE TIME:   Greater than 30 minutes     Electronically signed by:  GRISELDA Wyatt CNP        Documentation of Face-to-Face and Certification for Home Health Services     Patient: Charlie Hayes   YOB: 1927  MR Number: [id number]  Today's Date: 5/1/2017    I certify that patient: Charlie Hayes is under my care and that I, or a nurse practitioner or physician's assistant working with me, had a face-to-face encounter that meets the physician face-to-face encounter requirements with this patient on: 5/1/17.    This encounter with the patient was in whole, or in part, for the following medical condition, which is the primary reason for home health care: Acute Cerebral Infarction Left MCA Distribution, Likely Embolic Shower from Left Atrium.    I certify that, based on my findings, the following services are medically necessary home health services: Nursing, Occupational Therapy and Physical Therapy.    My clinical findings support the need for the above services because: Nurse is needed: To provide caregiver training to assist with: INR and Warfarin teaching.., Occupational Therapy Services are needed to assess and treat cognitive ability and address ADL safety due to impairment in ADLS with recent Acute Cerebral Infarction Left MCA Distribution, Likely Embolic Shower from Left Atrium.. and Physical Therapy Services are needed to assess and treat the following functional impairments: Gait instability secondary to bilateral knee pain and Acute Cerebral Infarction Left MCA Distribution, Likely  Embolic Shower from Left Atrium..    Further, I certify that my clinical findings support that this patient is homebound (i.e. absences from home require considerable and taxing effort and are for medical reasons or Alevism services or infrequently or of short duration when for other reasons) because: Requires assistance of another person or specialized equipment to access medical services because patient: Is unable to operate assistive equipment on their own...    Based on the above findings. I certify that this patient is confined to the home and needs intermittent skilled nursing care, physical therapy and/or speech therapy.  The patient is under my care, and I have initiated the establishment of the plan of care.  This patient will be followed by a physician who will periodically review the plan of care.  Physician/Provider to provide follow up care: Fito Prather    Responsible Medicare certified PECOS Physician: Dr. Yasmin Esparza  Physician Signature: See electronic signature associated with these discharge orders.  Date: 5/1/2017

## 2017-05-04 NOTE — LETTER
[address] [id number] [phone]      Re: Charlie Hayes       Patient should take 2.5 mg daily for the next week.  Recheck INR in 1 week.     Call me with med history from his TCU.    I know he was discharged rom the hospital a month ago on 2.5 mg dose   His current INR is therapeutic       Fito Prather MD

## 2017-05-09 NOTE — LETTER
[address] [address]  [phone]      May 9, 2017      Charlie Hayes  [address] [place] [id number]        Dear Charlie,    Following orders approved :       Order or referral being requested: Home Care      Additional comments: Requesting continued occupational therapy 2 times a week for 8 weeks to increase activity tolerance, functional mobility, falls prevention and ADL's.        Sincerely,        Fito Prather MD

## 2017-05-09 NOTE — TELEPHONE ENCOUNTER
Reason for Call: Request for an order or referral:    Order or referral being requested: Home Care    Date needed: as soon as possible    Additional comments: Requesting continued occupational therapy 2 times a week for 8 weeks to increase activity tolerance, functional mobility, falls prevention and ADL's.    Phone number Patient can be reached at:   [facility] [phone]       Best Time:  anytime    Can we leave a detailed message on this number?  YES    Call taken on 5/9/2017 at 12:43 PM by Terrie Garcia

## 2017-05-10 NOTE — LETTER
[address] [id number] [phone]      May 11, 2017      Charlie Hayes [birth year]  [address] [place] [id number]      phiue coumadin at 2.5 mg daily \  Next INR 2 weeks       Please contact the INR nurse at [phone] who would be dealing with this for orders          Sincerely,  Dr. Prather

## 2017-05-10 NOTE — TELEPHONE ENCOUNTER
Routing to PCP to advise.   Dr. Prather, need new INR/ Coumadin orders and refill.     RN called [facility] and was connected with LÁZARO Hinton.    Mary Jane reports that INR was 2.55 today (5/10) and patient is taking 2.5 mg daily.   Mary Jane needs the following faxed to [phone] ATTN: Mary Jane    1) Updated Coumadin orders  2) Coumadin refill orders. Current Rx. expires tomorrow.  3) Needs new INR draw date.    Please fax INR orders to Mary Jane at number above.   They then fax orders to their pharmacy that [facility] uses ([facility] in [place]).  RN states that Charlie has taken his Coumadin dose today and the orders can be faxed tomorrow.    Zeenat Callaway RN  [facility]

## 2017-05-10 NOTE — TELEPHONE ENCOUNTER
Home care nurse, Mary Jane calling to give an update. She would also like the INR orders to be faxed over to her at  ATTN: Mary Jane.

## 2017-05-11 PROBLEM — Z79.01 LONG-TERM (CURRENT) USE OF ANTICOAGULANTS: Status: ACTIVE | Noted: 2017-01-01

## 2017-05-11 PROBLEM — I48.91 ATRIAL FIBRILLATION (H): Status: ACTIVE | Noted: 2017-01-01

## 2017-05-11 NOTE — TELEPHONE ENCOUNTER
conitnue coumadin at 2.5 mg daily \  Next INR 2 weeks     INR clinic order placed.   Please contact the INR nurse who would be dealing with this.

## 2017-05-11 NOTE — PROGRESS NOTES
ANTICOAGULATION FOLLOW-UP CLINIC VISIT    Patient Name:  Charlie Hayes  Date:  5/11/2017  Contact Type:  Telephone/ Mary Jane [phone] calling with INR results.    SUBJECTIVE:     Patient Findings     Positives No Problem Findings           OBJECTIVE    INR   Date Value Ref Range Status   05/11/2017 2.55  Final       ASSESSMENT / PLAN  INR assessment THER    Recheck INR In: 2 WEEKS    INR Location [facility]      Anticoagulation Summary as of 5/11/2017     INR goal 2.0-3.0   Today's INR 2.55   Maintenance plan 2.5 mg (2.5 mg x 1) every day   Full instructions 2.5 mg every day   Weekly total 17.5 mg   Plan last modified Noris Cifuentes, RN (5/11/2017)   Next INR check 5/25/2017   Target end date     Indications   Long-term (current) use of anticoagulants [Z79.01] [Z79.01]  Atrial fibrillation (H) [I48.91] [I48.91]         Anticoagulation Episode Summary     INR check location     Preferred lab     Send INR reminders to [facility]    Comments       Anticoagulation Care Providers     Provider Role Specialty Phone number    ValeriewniFito hines MD  Family Practice [phone]            See the Encounter Report to view Anticoagulation Flowsheet and Dosing Calendar (Go to Encounters tab in chart review, and find the Anticoagulation Therapy Visit)    Dosage adjustment made based on physician directed care plan.    Noris Cifuentes RN

## 2017-05-11 NOTE — TELEPHONE ENCOUNTER
Yes.  I already spoke to Mary Jane.    She asked for order to be filled out and signed by Dr TRUJILLO and faxed to her.  She said she was going to fax to Dr TRUJILLO at main clinic fax number for him to sign and have faxed back to her.  They cannot take my RN order apparently.    Route to     Noris Marr RN

## 2017-05-11 NOTE — MR AVS SNAPSHOT
Charlie Hayes   5/11/2017   Anticoagulation Therapy Visit    Description:  89 year old male   Provider:  Fito Prather MD   Department:  Cp Nurse           INR as of 5/11/2017     Today's INR 2.55      Anticoagulation Summary as of 5/11/2017     INR goal 2.0-3.0   Today's INR 2.55   Full instructions 2.5 mg every day   Next INR check 5/25/2017    Indications   Long-term (current) use of anticoagulants [Z79.01] [Z79.01]  Atrial fibrillation (H) [I48.91] [I48.91]         Contact Numbers     [facility]  Please call [phone] or [phone]  to cancel and/or reschedule your appointment.   Please call [phone] with any problems or questions regarding your therapy          May 2017 Details    Sun Mon Tue Wed Thu Fri Sat      1               2               3               4               5               6                 7               8               9               10               11      2.5 mg   See details      12      2.5 mg         13      2.5 mg           14      2.5 mg         15      2.5 mg         16      2.5 mg         17      2.5 mg         18      2.5 mg         19      2.5 mg         20      2.5 mg           21      2.5 mg         22      2.5 mg         23      2.5 mg         24      2.5 mg         25            26               27                 28               29               30               31                   Date Details   05/11 This INR check       Date of next INR:  5/25/2017         How to take your warfarin dose     To take:  2.5 mg Take 1 of the 2.5 mg tablets.

## 2017-05-15 NOTE — TELEPHONE ENCOUNTER
Forms received from: [facility]   Phone number listed: [phone]   Fax listed: [phone]  Date received: 05/15/2017  Form description: Increase PT frequency to high need s/p recent CVA  Once forms are completed, please return to [facility] via fax.  Is patient requesting to be contacted when forms are completed: NA    Form placed: In provider's basket  Naomi Alexander

## 2017-07-13 NOTE — PROGRESS NOTES
"[facility]  PRIMARY CARE PROVIDER AND CLINIC:  Fito Prather [facility] [address] [facility] / [place], [place]  Chief Complaint   Patient presents with     Hospital F/U       HPI:    Charlie Hayes is a 89 year old  (2/13/1927),admitted to the [facility] at [facility] from Hospital  [facility].  Hospital stay 7/8/2017 through 7/12/2017.  Admitted to this facility for  rehab, medical management and nursing care.  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 Noted to be hypertensive on admission. Telemetry revealed atrial fibrillation with RVR and Metoprolol was initiated. Head CT and cervical spine CT negative for acute findings. Unable to perform MRI due to agitation and restlessness. Noted to have urinary retention and a herman catheter was placed. A chest, abdominal and pelvic CT revealed \"multifocal right renal cell carcinoma\". Urology and Oncology were consulted. Not a surgical candidate. Observation recommended. 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 Hydrochlorothiazide discontinued. Received Desmopressin. Palliative Care was consulted for goals of care. A TCU stay was recommended for physical rehabilitation.    Current issues are:        Complains of left shoulder pain. States it started this morning. Unable to provide much detail surrounding pain. When asked about living at assisted living with wife, replies \"I don't like with my wife\".     CODE STATUS/ADVANCE DIRECTIVES DISCUSSION:   DNR / DNI  Patient's living condition: lives " in an assisted living facility    ALLERGIES:Keflex [cephalexin monohydrate]; Ibuprin [ibuprofen micronized]; Indocin [indometacin sodium]; and Nuts  PAST MEDICAL HISTORY:  has a past medical history of AR (allergic rhinitis); Colon polyps, hyperplastic (11/15/94); High cholesterol; HTN (hypertension); Lipoma; Macular degeneration; MI (myocardial infarction) (H) (05/01); Nasal fracture (1969); Nonsenile cataract; ALMITA (obstructive sleep apnea) (07/01); and S/P amputation of finger through MCP joint. He also has no past medical history of Amblyopia; Arthritis; Diabetes (H); Diabetic retinopathy (H); Glaucoma; Malignant hyperthermia; Retinal detachment; Strabismus; or Uveitis.  PAST SURGICAL HISTORY:  has a past surgical history that includes NONSPECIFIC PROCEDURE (1968-69); sinus surgery (1969); COLONOSCOPY THRU STOMA W BIOPSY/CAUTERY TUMOR/POLYP/LESION (11/15/95); pvd stenting (2001); Blepharoplasty bilateral (2/2010); Phacoemulsification with standard intraocular lens implant (6/2016); and cataract iol, rt/lt.  FAMILY HISTORY: family history includes Allergies in his mother; Asthma in his mother; CANCER in his mother; Cancer - colorectal in his brother; Cardiovascular in his brother, brother, and brother; Hypertension in his brother and brother; Prostate Cancer in his father; Respiratory in his mother.  SOCIAL HISTORY:  reports that he has never smoked. He has never used smokeless tobacco. He reports that he uses illicit drugs. He reports that he does not drink alcohol.    Post Discharge Medication Reconciliation Status: discharge medications reconciled, continue medications without change.  Current Outpatient Prescriptions   Medication Sig Dispense Refill     tamsulosin (FLOMAX) 0.4 MG capsule Take 0.4 mg by mouth daily       METOPROLOL TARTRATE PO Take 25 mg by mouth 2 times daily       polyethylene glycol (MIRALAX/GLYCOLAX) powder Take 17 g by mouth daily as needed for constipation       QUEtiapine (SEROQUEL) 25  MG tablet Take 12.5 mg by mouth daily       diclofenac (VOLTAREN) 1 % GEL topical gel Apply 4 g topically 2 times daily And BID PRN       ACETAMINOPHEN PO Take 1,000 mg by mouth 3 times daily        allopurinol (ZYLOPRIM) 100 MG tablet Take 100 mg by mouth daily       losartan (COZAAR) 25 MG tablet Take 50 mg by mouth daily        Calcium Carb-Cholecalciferol (CALCIUM 600 + D PO) Take 1 capsule by mouth daily       WARFARIN SODIUM PO Take by mouth daily Take as directed per INR results       Multiple Vitamins-Minerals (MULTIVITAMIN PO) Take 1 tablet by mouth daily       pravastatin (PRAVACHOL) 20 MG tablet Take 1 tablet (20 mg) by mouth daily (Due for Cholesterol labs for more refills) 90 tablet 3     carboxymethylcellulose (REFRESH PLUS) 0.5 % SOLN Place 1 drop into both eyes 2 times daily       nitroglycerin (NITROQUICK) 0.4 MG SL tablet Place 1 tablet under the tongue every 5 minutes as needed (1 tablet every 5 minutes as needed- Up to 3 per episode.). up to 3 tablets per episode. 25 tablet 3     OMEPRAZOLE 20 MG OR CPDR 1 CAPSULE DAILY       GLUCOSAMINE HCL 1000 MG OR TABS Take 1,500 mg by mouth daily        [DISCONTINUED] warfarin (COUMADIN) 2.5 MG tablet Take 1 tablet (2.5 mg) by mouth daily 7 tablet 0       ROS:  4 point ROS including Respiratory, CV, GI and , other than that noted in the HPI,  is negative    Exam:  /67  Pulse 74  Temp 97.9  F (36.6  C)  Resp 17  Wt 192 lb 6.4 oz (87.3 kg)  SpO2 98%  BMI 30.13 kg/m2  GENERAL APPEARANCE:  in no distress, lethargic  ENT:  Mouth and posterior oropharynx normal, moist mucous membranes  EYES:  EOM, conjunctivae, lids, pupils and irises normal  RESP:  respiratory effort and palpation of chest normal, lungs clear to auscultation , no respiratory distress, in anterior lobes. Unable to auscultate posterior lobes  CV:  Palpation and auscultation of heart done , regular rate and rhythm, no murmur, rub, or gallop  ABDOMEN:  normal bowel sounds, soft,  nontender, no hepatosplenomegaly or other masses  M/S:   Left-sided upper extremity weakness. Hand  weak on left-side.   SKIN:  Inspection of skin and subcutaneous tissue baseline, Palpation of skin and subcutaneous tissue baseline  NEURO:   Cranial nerves 2-12 are normal tested and grossly at patient's baseline  PSYCH:  memory impaired     Lab/Diagnostic data:  BASIC METABOLIC PANEL (07/12/2017 7:05 AM)  BASIC METABOLIC PANEL (07/12/2017 7:05 AM)   Component Value Ref Range   SODIUM 137 135 - 145 mmol/L   POTASSIUM 4.0 3.5 - 5.0 mmol/L   CHLORIDE 101 98 - 110 mmol/L   CO2,TOTAL 27 21 - 31 mmol/L   ANION GAP 9 5 - 18    GLUCOSE 97 65 - 100 mg/dL   CALCIUM 9.2 8.5 - 10.5 mg/dL   BUN 16 8 - 25 mg/dL   CREATININE 0.71 (L) 0.72 - 1.25 mg/dL   BUN/CREAT RATIO  23 (H) 10 - 20    GFR if African American >60 >60 ml/min/1.73m2   GFR if not African American >60 >60 ml/min/1.73m2     CBC WITH AUTO DIFFERENTIAL (07/10/2017 10:24 AM)  CBC WITH AUTO DIFFERENTIAL (07/10/2017 10:24 AM)   Component Value Ref Range   WHITE BLOOD COUNT  13.5 (H) 4.5 - 11.0 thou/cu mm   RED BLOOD COUNT  4.54 4.30 - 5.90 mil/cu mm   HEMOGLOBIN  14.3 13.5 - 17.5 g/dL   HEMATOCRIT  41.5 37.0 - 53.0 %   MCV  91 80 - 100 fL   MCH  31.5 26.0 - 34.0 pg   MCHC  34.5 32.0 - 36.0 g/dL   RDW  14.3 11.5 - 15.5 %   PLATELET COUNT  247 140 - 440 thou/cu mm   MPV  8.6 6.5 - 11.0 fL   NEUTROPHILS  88.0 (H) 42.0 - 72.0 %   LYMPHOCYTES  5.0 (L) 20.0 - 44.0 %   MONOCYTES  6.4 <12.0 %   EOSINOPHILS  0.1 <8.0 %   BASOPHILS  0.1 <3.0 %   IMMATURE GRANULOCYTES(METAS,MYELOS,PROS) 0.4 %   ABSOLUTE NEUTROPHILS  11.9 (H) 1.7 - 7.0 thou/cu mm   ABSOLUTE LYMPHOCYTES  0.7 (L) 0.9 - 2.9 thou/cu mm   ABSOLUTE MONOCYTES  0.9 (H) <0.9 thou/cu mm   ABSOLUTE EOSINOPHILS  0.0 <0.5 thou/cu mm   ABSOLUTE BASOPHILS  0.0 <0.3 thou/cu mm   ABSOLUTE IMMATURE GRANULOCYTES(METAS,MYELOS,PROS) 0.1 <0.3 thou/cu mm     ASSESSMENT/PLAN:  Left-Sided Shoulder Pain and Weakness. Upon completing  physical examination, updated daughter, Mayi, and son in-law on one-sided weakness not noted in hospital charting nor previous personal visits. As patient was unable to provide any further details, difficult to discern if weakness was related to shoulder pain versus stroke as patient is much more confused than when last seen 5/1/17. INR therapeutic 7/11/17. Discussed keeping at facility versus sending to ED for further evaluation. Daughter elected to keep at facility. Later in the day, patient assessed and mild weakness was noted in L vs R. Also stated left shoulder pain was gone. Nursing staff unaware of complaints of shoulder pain. Continue to monitor.     Recurrent Falls. Likely related to dementia and overall decline. Physical and Occupational Therapy ordered for deconditioning.    Delirium with Dementia. Likely vascular dementia. Noted since CVA in March. BIMS 11/15 and CPT 4.2/5.6 during previous TCU stay 3/28/17 through 5/3/17. Given lethargy noted due today's examination, will change Quetiapine from 12.5 mg QHS scheduled to PRN. Occupational Therapy to evaluate cognitive status.     Hyponatremia. Likely secondary to mild SIADH plus Hydrochlorothiazide. Hydrochlorothiazide discontinued during hospitalization. Last Sodium 132 on 7/11/17. Repeat BMP on 7/14/17 to ensure stability.     Atrial Fibrillation with Rapid Ventricular Rate. Previously on Atenolol. Admitted to hospital on no beta blocker. Metoprolol initiated. Continue Warfarin as ordered. Next INR 7/14/17. Previous home dose 5 mg every Monday and Friday. 2.5 mg all other days.    Right Renal Cell Carcinoma. Incidental finding on chest, abdominal and pelvic CT 7/8/17. Urology and Oncology consulted. Not a surgical candidate. Observation recommended. Thought to be too small to generate metastatic disease. No follow-up indicated.    Urinary Retention. Discharged from hospital with herman catheter. Attempt voiding trials on 7/17/17. If PVR > 400 ml x 1, may  straight cath x 1. If PVR > 400 ml x 2, re-insert herman catheter long term. No need to follow-up with Urology. Continue Tamsulosin as ordered. May want to re-evaluate benefits versus risks of Tamsulosin given falls.    Left MCA CVA March 2017. Follow-up with Dr. Garcia at [facility] as recommended. Incidental finding of supraclinoid left ICA aneurysm noted on imaging in March 2017.  Continue Warfarin and Pravastatin as ordered.         History of Gout and History of Degenerative Joint Disease. Continue Allopurinol, Acetaminophen and Voltaren gel as ordered. Also on Glucosamine and Calcium-Vitamin D.       Hyperlipidemia/Hypertension/History of MI. Monitor blood pressure daily. Continue Metoprolol, Pravastatin, Nitroglycerin and Losartan as ordered. Previously on Hydrochlorothiazide which was discontinued during hospitalization.       Obstructive Sleep Apnea. Quit using CPAP.       Gastroesophageal Reflux Disease. Continue Omeprazole as ordered.    Information reviewed:  Medications, vital signs, orders, nursing notes, problem list, hospital information. Total time spent with patient visit was 45 min including patient visit and review of past records. Greater than 50% of total time spent with counseling and coordinating care.    Electronically signed by:  GRISELDA Wyatt CNP

## 2017-07-19 NOTE — PROGRESS NOTES
[facility]    Chief Complaint   Patient presents with     Nursing Home Acute     Hypertension       HPI:    Charlie Hayes is a 89 year old  (2/13/1927), who is being seen today for an episodic care visit at  [facility].    HPI information obtained from: facility chart records, facility staff, patient report and [place] Epic chart review.     Today's concern is:  1. Hypertension goal BP (blood pressure) < 140/90    2. Cognitive impairment    - nursing brought up a concern from therapies about working with Charlie when his blood pressures are elevated.  Wondering if there should be parameters of when not to be working with him.  Nursing had printed out P and B/P readings for this NP.  In briefly looking at them, adjustments needed to be made with his medications.  Did not see any reason not to work with Charlie.  Found Charlie sitting up in his w/c in his room.  Pleasant but could see cognitive impairment.      REVIEW OF SYSTEMS:  4 point ROS including Respiratory, CV, GI and , other than that noted in the HPI,  is negative    BP (!) 181/98  Pulse 80  Temp 97.5  F (36.4  C)  Resp 20  Wt 192 lb 6.4 oz (87.3 kg)  SpO2 95%  BMI 30.13 kg/m2  GENERAL APPEARANCE:  Alert, in no distress, but not very engaging in conversation.  Could answer simple questions.  Heart rate regular/irregular.  Had a piece of klennex on chin due to nicking self with shaving.  Lungs are clear with good deep breaths upon command.    Pulses range from = 70-90's  Blood pressures range from 140's- 180's/'s    Current Outpatient Prescriptions   Medication Sig Dispense Refill     HYDROcodone-acetaminophen (NORCO) 5-325 MG per tablet Take 1 tablet by mouth every 4 hours as needed for moderate to severe pain       METOPROLOL TARTRATE PO Take 25 mg by mouth 2 times daily       polyethylene glycol (MIRALAX/GLYCOLAX) powder Take 17 g by mouth daily as needed for constipation       QUEtiapine (SEROQUEL) 25  MG tablet Take 12.5 mg by mouth daily as needed       diclofenac (VOLTAREN) 1 % GEL topical gel Apply 4 g topically 2 times daily And BID PRN       ACETAMINOPHEN PO Take 1,000 mg by mouth 3 times daily        allopurinol (ZYLOPRIM) 100 MG tablet Take 100 mg by mouth daily       losartan (COZAAR) 25 MG tablet Take 100 mg by mouth daily        WARFARIN SODIUM PO Take by mouth daily Take as directed per INR results       carboxymethylcellulose (REFRESH PLUS) 0.5 % SOLN Place 1 drop into both eyes 2 times daily       nitroglycerin (NITROQUICK) 0.4 MG SL tablet Place 1 tablet under the tongue every 5 minutes as needed (1 tablet every 5 minutes as needed- Up to 3 per episode.). up to 3 tablets per episode. 25 tablet 3     OMEPRAZOLE 20 MG OR CPDR 1 CAPSULE DAILY       GLUCOSAMINE HCL 1000 MG OR TABS Take 1,500 mg by mouth daily          ASSESSMENT/PLAN:  1. Hypertension goal BP (blood pressure) < 140/90 - reviewed the list of his medications and saw he was on Losartan as well as Metoprolol.  Could go with either one due to the pulses not being to low.  Decided to increase the Losartan to 100mg daily.    Will update regular NP of change with blood pressure medication and follow up with B/P's  Feel that therapies can work with Charlie unless he complains of lightheadedness or other cardiac symptoms.   2. Cognitive impairment - continue with therapies, mobility and safe environment due to impairment.       Total time spent with patient visit at the skilled nursing facility was 15 min including patient visit, review of past records and discussion with nursing. Greater than 50% of total time spent with counseling and coordinating care due to elevated B/P's    GRISELDA Melo CNP

## 2017-07-19 NOTE — MR AVS SNAPSHOT
"              After Visit Summary   7/19/2017    Charlie Hayes    MRN: [id number]           Patient Information     Date Of Birth          2/13/1927        Visit Information        Provider Department      7/19/2017 12:30 PM Marcia Elliott APRN CNP Geriatrics Transitional [facility]        Today's Diagnoses     Hypertension goal BP (blood pressure) < 140/90    -  1    Cognitive impairment           Follow-ups after your visit        Your next 10 appointments already scheduled     Nov 06, 2017 10:00 AM CST   New Visit with Vincent De Luna MD   [facility]    [address] [place] [id number] [phone]              Who to contact     If you have questions or need follow up information about today's clinic visit or your schedule please contact G[facility] directly at [phone].  Normal or non-critical lab and imaging results will be communicated to you by MyChart, letter or phone within 4 business days after the clinic has received the results. If you do not hear from us within 7 days, please contact the clinic through MyChart or phone. If you have a critical or abnormal lab result, we will notify you by phone as soon as possible.  Submit refill requests through [employer] or call your pharmacy and they will forward the refill request to us. Please allow 3 business days for your refill to be completed.          Additional Information About Your Visit        MyChart Information     [employer] lets you send messages to your doctor, view your test results, renew your prescriptions, schedule appointments and more. To sign up, go to [email or web address] . Click on \"Log in\" on the left side of the screen, which will take you to the Welcome page. Then click on \"Sign up Now\" on the right side of the page.     You will be asked to enter the access code listed below, as well as some personal information. Please follow the directions to create your " username and password.     Your access code is: [email or web address]  Expires: 10/20/2017  8:30 AM     Your access code will  in 90 days. If you need help or a new code, please call your [facility] clinic or [phone].        Care EveryWhere ID     This is your Care EveryWhere ID. This could be used by other organizations to access your [facility] medical records  [id number]        Your Vitals Were     Pulse Temperature Respirations Pulse Oximetry BMI (Body Mass Index)       80 97.5  F (36.4  C) 20 95% 30.13 kg/m2        Blood Pressure from Last 3 Encounters:   17 (!) 175/111   17 (!) 181/98   17 118/67    Weight from Last 3 Encounters:   17 195 lb 9.6 oz (88.7 kg)   17 192 lb 6.4 oz (87.3 kg)   17 192 lb 6.4 oz (87.3 kg)              Today, you had the following     No orders found for display       Primary Care Provider Office Phone # Fax #    Fito Prather [phone]       [facility] [address] [facility] [address]        Equal Access to Services     EMMY MEDLEY : Hadii michael ku hadasho Soomaali, waaxda luqadaha, qaybta kaalmada adeegyada, waxay idiin hayeva sow. So [facility] [phone].    ATENCIÓN: Si habla español, tiene a adrian disposición servicios gratuitos de asistencia lingüística. Llvitaliy al [phone].    We comply with applicable federal civil rights laws and [place] laws. We do not discriminate on the basis of race, color, national origin, age, disability sex, sexual orientation or gender identity.            Thank you!     Thank you for choosing G[facility]  for your care. Our goal is always to provide you with excellent care. Hearing back from our patients is one way we can continue to improve our services. Please take a few minutes to complete the written survey that you may receive in the mail after your visit with us. Thank you!             Your Updated Medication List - Protect  others around you: Learn how to safely use, store and throw away your medicines at www.disposemymeds.org.          This list is accurate as of: 7/19/17 11:59 PM.  Always use your most recent med list.                   Brand Name Dispense Instructions for use Diagnosis    ACETAMINOPHEN PO      Take 1,000 mg by mouth 3 times daily        allopurinol 100 MG tablet    ZYLOPRIM     Take 100 mg by mouth daily        carboxymethylcellulose 0.5 % Soln ophthalmic solution    REFRESH PLUS     Place 1 drop into both eyes 2 times daily        diclofenac 1 % Gel topical gel    VOLTAREN     Apply 4 g topically 2 times daily And BID PRN        Glucosamine HCl 1000 MG Tabs      Take 1,500 mg by mouth daily        HYDROcodone-acetaminophen 5-325 MG per tablet    NORCO     Take 1 tablet by mouth every 4 hours as needed for moderate to severe pain        losartan 25 MG tablet    COZAAR     Take 100 mg by mouth daily        METOPROLOL TARTRATE PO      Take 25 mg by mouth 2 times daily        nitroGLYcerin 0.4 MG sublingual tablet    NITROQUICK    25 tablet    Place 1 tablet under the tongue every 5 minutes as needed (1 tablet every 5 minutes as needed- Up to 3 per episode.). up to 3 tablets per episode.    Chest pain       omeprazole 20 MG CR capsule    priLOSEC     1 CAPSULE DAILY        polyethylene glycol powder    MIRALAX/GLYCOLAX     Take 17 g by mouth daily as needed for constipation        QUEtiapine 25 MG tablet    SEROquel     Take 12.5 mg by mouth daily as needed        WARFARIN SODIUM PO      Take by mouth daily Take as directed per INR results
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
## 2017-07-24 NOTE — PROGRESS NOTES
[facility]    Chief Complaint   Patient presents with     RECHECK     HPI:    Charlie Hayes is a 89 year old  (2/13/1927), who is being seen today for an episodic care visit at [facility].  HPI information obtained from: facility chart records, facility staff, patient report and [facility] chart review. Today's concern is:    Hypertension. Losartan increased to 100 mg on 7/19/17. Since that time, systolic blood pressure . Diastolic range from . This morning, blood pressure 95/69. Most blood pressures elevated in the morning, but improved by the afternoon.     Dementia without Behavioral Disturbance. Noted to be more lethargic and confused on examination 7/13/17. Quetiapine changed from scheduled to PRN. No behaviors noted while in TCU. Upon review of documentation, has not utilized Quetiapine since TCU admission.     Paroxysmal Atrial Fibrillation. Seen by [facility] NP. Family and patient have decided to transition to [facility] after therapies have ended. Warfarin discontinued as well as Tamsulosin, Multivitamin, Pravastatin and Calcium. All labs also cancelled.     Urinary Retention. Due to goal of comfort, herman catheter was not removed and voiding trials were not attempted per [facility].    ALLERGIES: Keflex [cephalexin monohydrate]; Ibuprin [ibuprofen micronized]; Indocin [indometacin sodium]; and Nuts  Past Medical, Surgical, Family and Social History reviewed and updated in [facility].    Current Outpatient Prescriptions   Medication Sig Dispense Refill     HYDROcodone-acetaminophen (NORCO) 5-325 MG per tablet Take 1 tablet by mouth every 4 hours as needed for moderate to severe pain       METOPROLOL TARTRATE PO Take 25 mg by mouth 2 times daily       polyethylene glycol (MIRALAX/GLYCOLAX) powder Take 17 g by mouth daily as needed for constipation       QUEtiapine (SEROQUEL) 25 MG tablet Take 12.5 mg by mouth daily as  needed       diclofenac (VOLTAREN) 1 % GEL topical gel Apply 4 g topically 2 times daily And BID PRN       ACETAMINOPHEN PO Take 1,000 mg by mouth 3 times daily        allopurinol (ZYLOPRIM) 100 MG tablet Take 100 mg by mouth daily       losartan (COZAAR) 25 MG tablet Take 100 mg by mouth daily        carboxymethylcellulose (REFRESH PLUS) 0.5 % SOLN Place 1 drop into both eyes 2 times daily       nitroglycerin (NITROQUICK) 0.4 MG SL tablet Place 1 tablet under the tongue every 5 minutes as needed (1 tablet every 5 minutes as needed- Up to 3 per episode.). up to 3 tablets per episode. 25 tablet 3     OMEPRAZOLE 20 MG OR CPDR 1 CAPSULE DAILY       GLUCOSAMINE HCL 1000 MG OR TABS Take 1,500 mg by mouth daily        Medications reviewed:  Medications reconciled to facility chart and changes were made to reflect current medications as identified as above med list. Below are the changes that were made:   Medications stopped since last EPIC medication reconciliation:   There are no discontinued medications.    Medications started since last [facility] medication reconciliation:  No orders of the defined types were placed in this encounter.    REVIEW OF SYSTEMS:  4 point ROS including Respiratory, CV, GI and , other than that noted in the HPI,  is negative    Physical Exam:  BP 95/69  Pulse 83  Temp 96.3  F (35.7  C)  Resp 20  Wt 174 lb (78.9 kg)  SpO2 95%  BMI 27.25 kg/m2  GENERAL APPEARANCE:  Alert, in no distress  ENT:  Mouth and posterior oropharynx normal, moist mucous membranes  EYES:  EOM, conjunctivae, lids, pupils and irises normal  RESP:  respiratory effort and palpation of chest normal, lungs clear to auscultation , no respiratory distress  CV:  Palpation and auscultation of heart done , regular rate and rhythm, no murmur, rub, or gallop  ABDOMEN:  normal bowel sounds, soft, nontender, no hepatosplenomegaly or other masses  M/S:   Active movement of bilateral upper and lower extremities.  SKIN:  Inspection of  skin and subcutaneous tissue baseline, Palpation of skin and subcutaneous tissue baseline  NEURO:   Cranial nerves 2-12 are normal tested and grossly at patient's baseline  PSYCH:  memory impaired     Recent Labs:    Last Basic Metabolic Panel:  Lab Results   Component Value Date     07/14/2017      Lab Results   Component Value Date    POTASSIUM 4.2 07/14/2017     Lab Results   Component Value Date    CHLORIDE 100 07/14/2017     Lab Results   Component Value Date    QUYEN 9.6 07/14/2017     Lab Results   Component Value Date    CO2 24 07/14/2017     Lab Results   Component Value Date    BUN 19 07/14/2017     Lab Results   Component Value Date    CR 0.64 07/14/2017     Lab Results   Component Value Date    GLC 81 07/14/2017     Lab Results   Component Value Date    WBC 11.4 07/14/2017     Lab Results   Component Value Date    RBC 4.24 07/14/2017     Lab Results   Component Value Date    HGB 13.6 07/14/2017     Lab Results   Component Value Date    HCT 40.0 07/14/2017     Lab Results   Component Value Date    MCV 94 07/14/2017     Lab Results   Component Value Date    MCH 32.1 07/14/2017     Lab Results   Component Value Date    MCHC 34.0 07/14/2017     Lab Results   Component Value Date    RDW 14.6 07/14/2017     Lab Results   Component Value Date     07/14/2017     Assessment/Plan:  Hypertension. Blood pressures elevated in the mornings and with therapies, but given hypotensive episode this morning, hesitate to increase medications at this time. Continue to monitor blood pressures daily. Taking Metoprolol and Losartan.     Dementia without Behavioral Disturbance. No behaviors noted since TCU admission. If continuing not to utilize Quetiapine PRN, will discontinue. Goal of care is comfort. Will transition to [facility] after completing therapies in the TCU.     Paroxysmal Atrial Fibrillation. Warfarin discontinued by [facility] Palliative Care team. Continue Metoprolol as ordered.     Urinary Retention.  Due to goal of comfort, voiding trial not attempted. Keene catheter to remain in place indefinitely. Tamsulosin discontinued due to goal of comfort.    Electronically signed by  GRISELDA Wyatt CNP

## 2017-07-27 NOTE — PROGRESS NOTES
[place]    Chief Complaint   Patient presents with     Nursing Home Acute     HPI:    Charlie Hayes is a 89 year old  (2/13/1927), who is being seen today for an episodic care visit at [facility].  HPI information obtained from: facility chart records, facility staff, patient report and [facility] chart review.Today's concern is:    Hypertension. Upon review of blood pressure since previous visit 7/24/17, systolic range from 148-185. Diastolic range from . Asymptomatic during hypertensive episodes.    Dementia without Behavioral Disturbance. No behaviors noted since TCU admission. Has not utilized Quetiapine PRN since TCU admission.     Osteoarthritis. Denies pain. Has not utilized Hydrocodone-Acetaminophen in at least 2 weeks.    ALLERGIES: Keflex [cephalexin monohydrate]; Ibuprin [ibuprofen micronized]; Indocin [indometacin sodium]; and Nuts  Past Medical, Surgical, Family and Social History reviewed and [facility].    Current Outpatient Prescriptions   Medication Sig Dispense Refill     HYDROcodone-acetaminophen (NORCO) 5-325 MG per tablet Take 1 tablet by mouth every 4 hours as needed for moderate to severe pain       METOPROLOL TARTRATE PO Take 25 mg by mouth 2 times daily       polyethylene glycol (MIRALAX/GLYCOLAX) powder Take 17 g by mouth daily as needed for constipation       diclofenac (VOLTAREN) 1 % GEL topical gel Apply 4 g topically 2 times daily And BID PRN       ACETAMINOPHEN PO Take 1,000 mg by mouth 3 times daily        allopurinol (ZYLOPRIM) 100 MG tablet Take 100 mg by mouth daily       losartan (COZAAR) 25 MG tablet Take 100 mg by mouth daily        carboxymethylcellulose (REFRESH PLUS) 0.5 % SOLN Place 1 drop into both eyes 2 times daily       nitroglycerin (NITROQUICK) 0.4 MG SL tablet Place 1 tablet under the tongue every 5 minutes as needed (1 tablet every 5 minutes as needed- Up to 3 per episode.). up to 3 tablets per episode.  25 tablet 3     OMEPRAZOLE 20 MG OR CPDR 1 CAPSULE DAILY       GLUCOSAMINE HCL 1000 MG OR TABS Take 1,500 mg by mouth daily        Medications reviewed:  Medications reconciled to facility chart and changes were made to reflect current medications as identified as above med list. Below are the changes that were made:   Medications stopped since last EPIC medication reconciliation:   There are no discontinued medications.    Medications started since last [facility] medication reconciliation:  No orders of the defined types were placed in this encounter.    REVIEW OF SYSTEMS:  4 point ROS including Respiratory, CV, GI and , other than that noted in the HPI,  is negative    Physical Exam:  BP (!) 175/119  Pulse 89  Temp 98.6  F (37  C)  Resp 20  Wt 174 lb (78.9 kg)  SpO2 94%  BMI 27.25 kg/m2  GENERAL APPEARANCE:  Alert, in no distress  ENT:  Mouth and posterior oropharynx normal, moist mucous membranes  EYES:  EOM, conjunctivae, lids, pupils and irises normal  RESP:  respiratory effort and palpation of chest normal, lungs clear to auscultation , no respiratory distress  CV:  Palpation and auscultation of heart done , regular rate and rhythm, no murmur, rub, or gallop  ABDOMEN:  normal bowel sounds, soft, nontender, no hepatosplenomegaly or other masses  M/S:   Active movement of bilateral upper and lower extremities.  SKIN:  Inspection of skin and subcutaneous tissue baseline, Palpation of skin and subcutaneous tissue baseline  NEURO:   Cranial nerves 2-12 are normal tested and grossly at patient's baseline  PSYCH:  memory impaired     Recent Labs:    Last Basic Metabolic Panel:  Lab Results   Component Value Date     07/14/2017      Lab Results   Component Value Date    POTASSIUM 4.2 07/14/2017     Lab Results   Component Value Date    CHLORIDE 100 07/14/2017     Lab Results   Component Value Date    QUYEN 9.6 07/14/2017     Lab Results   Component Value Date    CO2 24 07/14/2017     Lab Results   Component  Value Date    BUN 19 07/14/2017     Lab Results   Component Value Date    CR 0.64 07/14/2017     Lab Results   Component Value Date    GLC 81 07/14/2017     Lab Results   Component Value Date    WBC 11.4 07/14/2017     Lab Results   Component Value Date    RBC 4.24 07/14/2017     Lab Results   Component Value Date    HGB 13.6 07/14/2017     Lab Results   Component Value Date    HCT 40.0 07/14/2017     Lab Results   Component Value Date    MCV 94 07/14/2017     Lab Results   Component Value Date    MCH 32.1 07/14/2017     Lab Results   Component Value Date    MCHC 34.0 07/14/2017     Lab Results   Component Value Date    RDW 14.6 07/14/2017     Lab Results   Component Value Date     07/14/2017     Assessment/Plan:  Hypertension. Losartan increased to 100 mg on 7/19/17. Due to persistently elevated blood pressures, will increase Metoprolol from 25 mg to 50 mg BID. Heart rate 60-80s. If blood pressures continue to be elevated, may need to consider adding additional agent. Previously on Hydrochlorothiazide, which was discontinued due to hyponatremia, and Amlodipine.     Dementia without Behavioral Disturbance. Due to no behaviors and non-utilization of Quetiapine PRN, will discontinue Quetiapine. Goal of care is comfort. Will transition to [facility] after completing therapies in the TCU.     Osteoarthritis. No complaints of pain. Will decrease scheduled Acetaminophen from 1000 mg TID to 500 mg TID due to addition of Hydrocodone-Acetaminophen PRN by Palliative Care. Continue Diclofenac gel, Glucosamine and Hydrocodone-Acetaminophen as ordered.    Electronically signed by  GRISELDA Wyatt CNP

## 2017-07-31 NOTE — PROGRESS NOTES
[facility]    Chief Complaint   Patient presents with     Nursing Home Acute     HPI:    Charlie Hayes is a 89 year old  (2/13/1927), who is being seen today for an episodic care visit at [facility].  HPI information obtained from: facility chart records, facility staff, patient report and [facility] chart review. Today's concern is:    Hypertension. Losartan increased to 100 mg on 7/19/17. Metoprolol increased to 50 mg BID on 7/27/17. Since 7/27/17, systolic blood pressure range from 165-185. Diastolic range from . Denies dizziness or headache.    Recurrent Falls. Noted prior to TCU admission. Continues to work with Physical, Speech and Occupational Therapy. As of 7/25/17: SBA-to-CGA with bed mobility with rails and cues for technique. Transfers with minimum-to-moderate assist 1-2. Transferring with EZ aid with staff. Worsening right-sided lean. Sitting on edge of bed with minimum-to-moderate assist with cues for posture. Maximum assistance with lower extremity ADLs. SBA with grooming/hygiene. Using EZ aid for transfers. Nectar thick fluids.     Dementia without Behavioral Disturbance. CPT Score 4.0/5.6 indicates 24 hour supervision. BIMS Score 9/15. Previous TCU stay 3/28/17 through 5/3/17 BIMS 11/15 and CPT Score 4.2/5.6. Quetiapine PRN discontinued 7/27/17 due to no behaviors reported.    ALLERGIES: Keflex [cephalexin monohydrate]; Ibuprin [ibuprofen micronized]; Indocin [indometacin sodium]; and Nuts  Past Medical, Surgical, Family and Social History reviewed and updated in [facility].    Current Outpatient Prescriptions   Medication Sig Dispense Refill     HYDROcodone-acetaminophen (NORCO) 5-325 MG per tablet Take 1 tablet by mouth every 4 hours as needed for moderate to severe pain       METOPROLOL TARTRATE PO Take 50 mg by mouth 2 times daily        polyethylene glycol (MIRALAX/GLYCOLAX) powder Take 17 g by mouth daily as needed for constipation        diclofenac (VOLTAREN) 1 % GEL topical gel Apply 4 g topically 2 times daily        ACETAMINOPHEN PO Take 500 mg by mouth 3 times daily        allopurinol (ZYLOPRIM) 100 MG tablet Take 100 mg by mouth daily       losartan (COZAAR) 25 MG tablet Take 100 mg by mouth daily        carboxymethylcellulose (REFRESH PLUS) 0.5 % SOLN Place 1 drop into both eyes 2 times daily       nitroglycerin (NITROQUICK) 0.4 MG SL tablet Place 1 tablet under the tongue every 5 minutes as needed (1 tablet every 5 minutes as needed- Up to 3 per episode.). up to 3 tablets per episode. 25 tablet 3     OMEPRAZOLE 20 MG OR CPDR 1 CAPSULE DAILY       GLUCOSAMINE HCL 1000 MG OR TABS Take 1,500 mg by mouth daily        Medications reviewed:  Medications reconciled to facility chart and changes were made to reflect current medications as identified as above med list. Below are the changes that were made:   Medications stopped since last EPIC medication reconciliation:   There are no discontinued medications.    Medications started since last [facility] medication reconciliation:  No orders of the defined types were placed in this encounter.    REVIEW OF SYSTEMS:  4 point ROS including Respiratory, CV, GI and , other than that noted in the HPI,  is negative    Physical Exam:  BP (!) 176/113  Pulse 90  Temp 98.3  F (36.8  C)  Resp 18  Wt 174 lb (78.9 kg)  SpO2 95%  BMI 27.25 kg/m2  GENERAL APPEARANCE:  Alert, in no distress  ENT:  Mouth and posterior oropharynx normal, moist mucous membranes  EYES:  EOM, conjunctivae, lids, pupils and irises normal  RESP:  respiratory effort and palpation of chest normal, lungs clear to auscultation , no respiratory distress  CV:  Palpation and auscultation of heart done , regular rate and rhythm, no murmur, rub, or gallop  ABDOMEN:  normal bowel sounds, soft, nontender, no hepatosplenomegaly or other masses  M/S:   Active movement of bilateral upper and lower extremities.  SKIN:  Inspection of skin and subcutaneous  tissue baseline, Palpation of skin and subcutaneous tissue baseline  NEURO:   Cranial nerves 2-12 are normal tested and grossly at patient's baseline  PSYCH:  memory impaired     Recent Labs:    Last Basic Metabolic Panel:  Lab Results   Component Value Date     07/14/2017      Lab Results   Component Value Date    POTASSIUM 4.2 07/14/2017     Lab Results   Component Value Date    CHLORIDE 100 07/14/2017     Lab Results   Component Value Date    QUYEN 9.6 07/14/2017     Lab Results   Component Value Date    CO2 24 07/14/2017     Lab Results   Component Value Date    BUN 19 07/14/2017     Lab Results   Component Value Date    CR 0.64 07/14/2017     Lab Results   Component Value Date    GLC 81 07/14/2017     Lab Results   Component Value Date    WBC 11.4 07/14/2017     Lab Results   Component Value Date    RBC 4.24 07/14/2017     Lab Results   Component Value Date    HGB 13.6 07/14/2017     Lab Results   Component Value Date    HCT 40.0 07/14/2017     Lab Results   Component Value Date    MCV 94 07/14/2017     Lab Results   Component Value Date    MCH 32.1 07/14/2017     Lab Results   Component Value Date    MCHC 34.0 07/14/2017     Lab Results   Component Value Date    RDW 14.6 07/14/2017     Lab Results   Component Value Date     07/14/2017     Assessment/Plan:  Hypertension. Contacted daughter, Mayi, via telephone. Discussed elevated blood pressures, risks including stroke and goal of comfort. Losartan is at maximum dose. Heart rate is in the 60s so hesitate to increase Metoprolol further. Discussed adding 3rd antihypertensive agent. Daughter and son in-law request not to add additional medications as patient is asymptomatic and on comfort care. Discussed decreasing frequency of blood pressure checks which daughter was in agreement with. Requested staff decrease blood pressure checks to minimum amount required per facility protocol.     Recurrent Falls. Related to dementia and overall decline. Physical,  Occupational and Speech Therapy involved. Goal is to return to Assisted Living with wife when discharged from therapies.    Dementia without Behavioral Disturbance. Goal of care is comfort. Will transition to Hospice after discharge from therapies.    Electronically signed by  GRISELDA Wyatt CNP

## 2017-08-03 NOTE — PROGRESS NOTES
[facility] DISCHARGE SUMMARY    PATIENT'S NAME: Charlie Hayes  YOB: 1927  MEDICAL RECORD NUMBER:  [id number]    PRIMARY CARE PROVIDER AND CLINIC RESPONSIBLE AFTER TRANSFER: Fito Prather [facility] [address] / [facility], [place]    CODE STATUS/ADVANCE DIRECTIVES DISCUSSION:   DNR / DNI    Allergies   Allergen Reactions     Keflex [Cephalexin Monohydrate] Anaphylaxis     Ibuprin [Ibuprofen Micronized]      Indocin [Indometacin Sodium]      Nuts        TRANSFERRING PROVIDERS: GRISELDA Wyatt CNP, Yasmin Esparza MD  DATE OF SNF ADMISSION:  July / 12 / 2017  DATE OF SNF (anticipated) DISCHARGE: August / 04 / 2017  DISCHARGE DISPOSITION: [facility] Provider   Nursing Facility: [facility]  [facility] stay 7/8/17 to 7/12/17.     Condition on Discharge:  Stable.  Function:  Working on knee flexion with stretching and strengthening. Standing in EZ Aid 5 minutes. Standing in parallel bars 1-2 minutes with minimum assistance and cues to stand tall. Minimum assistance sitting on edge of bed. Transfers with EZ Aid. Set-up for grooming/hygiene. Minimal assistance with upper extremities and maximum assistance with lower extremities. Maximum assistance for toileting. Transfers with moderate assistance x 2. Diet thin liquids with mechanical soft solids.  Cognitive Scores: BIMS 9/15 and CPT 4/5.6    Equipment: wheelchair    DISCHARGE DIAGNOSIS:   1. Recurrent falls    2. Dementia without behavioral disturbance, unspecified dementia type    3. Hyponatremia    4. Paroxysmal atrial fibrillation (H)    5. Renal cell carcinoma of right kidney (H)    6. Urinary retention    7. Chronic gout of knee, unspecified cause, unspecified laterality    8. Hyperlipidemia, unspecified hyperlipidemia type    9. Benign essential hypertension    10. ALMITA (obstructive sleep apnea)    11. Gastroesophageal reflux disease, esophagitis presence not  "specified    12. History of CVA (cerebrovascular accident)        HPI Nursing Facility Course:  HPI information obtained from: facility chart records, facility staff, patient report and [facility] chart review.    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 Noted to be hypertensive on admission. Telemetry revealed atrial fibrillation with RVR and Metoprolol was initiated. Head CT and cervical spine CT negative for acute findings. Unable to perform MRI due to agitation and restlessness. Noted to have urinary retention and a herman catheter was placed. A chest, abdominal and pelvic CT revealed \"multifocal right renal cell carcinoma\". Urology and Oncology were consulted. Not a surgical candidate. Observation recommended. 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 Hydrochlorothiazide discontinued. Received Desmopressin. Palliative Care was consulted for goals of care. A TCU stay was recommended for physical rehabilitation.    Seen by [facility] during TCU and family elected to transition to hospice once Physical, Occupational and Speech Therapy were completed in TCU.     Recurrent Falls. Likely related to dementia and overall decline. Completed Physical and Occupational Therapy. Will transition to [facility] with overall goal of comfort.     Dementia. Likely vascular dementia. Noted since CVA in March. CPT Score 4.0/5.6 indicates 24 hour supervision. BIMS Score 9/15. Previous TCU stay 3/28/17 through 5/3/17 BIMS 11/15 and CPT Score 4.2/5.6. Quetiapine PRN discontinued 7/27/17 due to no behaviors reported. Will transition to [facility] upon TCU discharge as goal of care is comfort.     Hyponatremia. Likely secondary to mild SIADH plus Hydrochlorothiazide. Hydrochlorothiazide discontinued during hospitalization. Last Sodium 136 on 7/14/17. No further lab draws while in TCU due to goal of comfort.     Atrial Fibrillation with Rapid Ventricular Rate. Previously on Atenolol. Admitted to hospital on no beta blocker. Metoprolol initiated. Warfarin discontinued by [facility] due to transition to hospice.      Right Renal Cell Carcinoma. Incidental finding on chest, abdominal and pelvic CT 7/8/17. Urology and Oncology consulted. Not a surgical candidate. Observation recommended. Thought to be too small to generate metastatic disease. No follow-up indicated.     Urinary Retention. Discharged from hospital with herman catheter. Voiding trials were not attempted during TCU stay due to goal of comfort. Herman catheter remains in place. Tamsulosin discontinued by [facility] due to medication reduction for goal of comfort.     Left MCA CVA March 2017. Follow-up with Dr. Garcia at [facility] as recommended. Incidental finding of supraclinoid left ICA aneurysm noted on imaging in March 2017.  Warfarin and Pravastatin discontinued by [facility].      History of Gout and History of Degenerative Joint Disease. Continue Allopurinol, Acetaminophen and Voltaren gel as ordered. Also on Glucosamine and Calcium-Vitamin D. Hydrocodone-Acetaminophen discontinued due to non-use.      Hyperlipidemia/Hypertension/History of MI. Systolic blood pressures consistently >160 over the past week. Contacted daughterMayi, via telephone 7/31/17. Discussed elevated blood pressures, risks including stroke and goal of comfort. Losartan is at maximum dose. Heart rate is in the 60s so hesitate to increase Metoprolol further. Discussed adding 3rd antihypertensive agent. Daughter and son in-law request not to add additional  medications as patient is asymptomatic and on comfort care. Would prefer blood pressures not be checked unless symptomatic. Pravastatin discontinued due to goal of comfort. Continue Nitroglycerin as ordered.      Obstructive Sleep Apnea. Quit using CPAP.       Gastroesophageal Reflux Disease. Continue Omeprazole as ordered.    PAST MEDICAL HISTORY:  has a past medical history of AR (allergic rhinitis); Colon polyps, hyperplastic (11/15/94); High cholesterol; HTN (hypertension); Lipoma; Macular degeneration; MI (myocardial infarction) (H) (05/01); Nasal fracture (1969); Nonsenile cataract; ALMITA (obstructive sleep apnea) (07/01); and S/P amputation of finger through MCP joint. He also has no past medical history of Amblyopia; Arthritis; Diabetes (H); Diabetic retinopathy (H); Glaucoma; Malignant hyperthermia; Retinal detachment; Strabismus; or Uveitis.    DISCHARGE MEDICATIONS:  Current Outpatient Prescriptions   Medication Sig Dispense Refill     METOPROLOL TARTRATE PO Take 50 mg by mouth 2 times daily        polyethylene glycol (MIRALAX/GLYCOLAX) powder Take 17 g by mouth daily as needed for constipation       diclofenac (VOLTAREN) 1 % GEL topical gel Apply 4 g topically 2 times daily        ACETAMINOPHEN PO Take 500 mg by mouth 3 times daily        allopurinol (ZYLOPRIM) 100 MG tablet Take 100 mg by mouth daily       losartan (COZAAR) 25 MG tablet Take 100 mg by mouth daily        carboxymethylcellulose (REFRESH PLUS) 0.5 % SOLN Place 1 drop into both eyes 2 times daily       nitroglycerin (NITROQUICK) 0.4 MG SL tablet Place 1 tablet under the tongue every 5 minutes as needed (1 tablet every 5 minutes as needed- Up to 3 per episode.). up to 3 tablets per episode. 25 tablet 3     OMEPRAZOLE 20 MG OR CPDR 1 CAPSULE DAILY       GLUCOSAMINE HCL 1000 MG OR TABS Take 1,500 mg by mouth daily        MEDICATION CHANGES/RATIONALE: See above  Controlled medications sent with patient: not applicable/none     ROS:    4 point ROS  including Respiratory, CV, GI and , other than that noted in the HPI,  is negative    Physical Exam:   Vitals: BP (!) 195/114  Pulse 87  Temp 97.2  F (36.2  C)  Resp 20  Wt 174 lb (78.9 kg)  SpO2 93%  BMI 27.25 kg/m2  BMI= Body mass index is 27.25 kg/(m^2).  GENERAL APPEARANCE:  Alert, in no distress  ENT:  Mouth and posterior oropharynx normal, moist mucous membranes  EYES:  EOM, conjunctivae, lids, pupils and irises normal  RESP:  respiratory effort and palpation of chest normal, lungs clear to auscultation , no respiratory distress  CV:  Palpation and auscultation of heart done , regular rate and rhythm, no murmur, rub, or gallop  ABDOMEN:  normal bowel sounds, soft, nontender, no hepatosplenomegaly or other masses  M/S:   Active movement of bilateral upper and lower extremities.  SKIN:  Inspection of skin and subcutaneous tissue baseline, Palpation of skin and subcutaneous tissue baseline  NEURO:   Cranial nerves 2-12 are normal tested and grossly at patient's baseline  PSYCH:  memory impaired     DISCHARGE PLAN:  Hospice  Patient instructed to follow-up with:  PCP in [place]       Current [facility] scheduled appointments:  Future Appointments  Date Time Provider Department Center   8/3/2017 10:00 AM Rosangela Santos APRN CNP [facility]   11/6/2017 10:00 AM Vincent De Luna MD [facility]       MTM referral needed and placed by this provider: No    Pending labs: None  SNF labs:  Lab Results   Component Value Date    WBC 11.4 07/14/2017     Lab Results   Component Value Date    RBC 4.24 07/14/2017     Lab Results   Component Value Date    HGB 13.6 07/14/2017     Lab Results   Component Value Date    HCT 40.0 07/14/2017     No components found for: MCT  Lab Results   Component Value Date    MCV 94 07/14/2017     Lab Results   Component Value Date    MCH 32.1 07/14/2017     Lab Results   Component Value Date    MCHC 34.0 07/14/2017     Lab Results   Component Value Date    RDW 14.6  07/14/2017     Lab Results   Component Value Date     07/14/2017     Last Basic Metabolic Panel:  Lab Results   Component Value Date     07/14/2017      Lab Results   Component Value Date    POTASSIUM 4.2 07/14/2017     Lab Results   Component Value Date    CHLORIDE 100 07/14/2017     Lab Results   Component Value Date    QUYEN 9.6 07/14/2017     Lab Results   Component Value Date    CO2 24 07/14/2017     Lab Results   Component Value Date    BUN 19 07/14/2017     Lab Results   Component Value Date    CR 0.64 07/14/2017     Lab Results   Component Value Date    GLC 81 07/14/2017       Discharge Treatments: See facility discharge orders    TOTAL DISCHARGE TIME:   Greater than 30 minutes  Electronically signed by:  GRISELDA Wyatt CNP        Documentation of Face-to-Face and Certification for Home Health Services     Patient: Charlie Hayes   YOB: 1927  MR Number: [id number]  Today's Date: 8/3/2017    I certify that patient: Charlie Hayes is under my care and that I, or a nurse practitioner or physician's assistant working with me, had a face-to-face encounter that meets the physician face-to-face encounter requirements with this patient on: 7/21/17.    This encounter with the patient was in whole, or in part, for the following medical condition, which is the primary reason for home health care: Dementia    I certify that, based on my findings, the following services are medically necessary home health services: Hospice.    My clinical findings support the need for the above services because: Hospice is needed due to overall decline from dementia and recent CVA    Further, I certify that my clinical findings support that this patient is homebound (i.e. absences from home require considerable and taxing effort and are for medical reasons or Mandae[facility] services or infrequently or of short duration when for other reasons) because: Requires assistance of another person or  specialized equipment to access medical services because patient: Requires supervision of another for safe transfer...    Based on the above findings. I certify that this patient is confined to the home and needs intermittent skilled nursing care, physical therapy and/or speech therapy.  The patient is under my care, and I have initiated the establishment of the plan of care.  This patient will be followed by a physician who will periodically review the plan of care.  Physician/Provider to provide follow up care: Fito Prather    Responsible Medicare certified [place] Physician: Dr. Yasmin Esparza  Physician Signature: See electronic signature associated with these discharge orders.  Date: 8/3/2017

## 2017-08-04 NOTE — TELEPHONE ENCOUNTER
TELEPHONE ENCOUNTER:      Charlie Hayes is a 89 year old  (2/13/1927),Nurse called today to report: patient is comfortable but eating and drinking very little.  He is taking Tylenol for comfort.  He will be signing onto Hospice on Monday or Tuesday     ASSESSMENT/PLAN  transitioning    Gave order to give Tylenol rectally if unable to swallow.    Gave order to hold medication if unable to swallow.    GRISELDA Plata CNP

## 2017-08-07 NOTE — TELEPHONE ENCOUNTER
Staff call noting patient decline - focus of care is comfort  Request medication to keep patient comfortable; feel that most of agitation at this time may be 2/2 pain    New Orders:  Roxanol 20mg/mL  5mg q2h PRN    Script faxed to [facility] for:  MS 20mg/mL  5mg q2h PRN  Sig #30mL  Refills #0 (0 month supply)    Trini Park, P  [facility]      Trini Park, P  [facility]

## 2017-08-07 NOTE — PROGRESS NOTES
[facility]    Chief Complaint   Patient presents with     RECHECK     HPI:    Charlie Hayes is a 89 year old  (2/13/1927), who is being seen today for an episodic care visit at [facility]. HPI information obtained from: facility chart records, facility staff, patient report and [facility] Epic chart review. Today's concern is:    Dementia with Transition to Hospice Care. On 8/4/17, on-call NP updated patient had poor oral intake. Planned to transition to [facility] 8/7/17. Order to hold medication if unable to swallow. On 8/5/17, on-call NP updated patient was continuing to decline. Ordered Roxanol PRN. Today, resident is laying in his bed and occasionally reaching out for things. Speech is garbled. Appears comfortable. Daughter and son in-law had originally wanted [place] to return to [place] with his wife and sign onto hospice today, but changed their minds as they did not want their mother watching their father die in their [place] apartment. Have elected to keep patient at facility. Will not be signing onto hospice until 8/10/17 due to payment issues.     REVIEW OF SYSTEMS:  4 point ROS including Respiratory, CV, GI and , other than that noted in the HPI,  is negative    BP (!) 187/104  Pulse 101  Temp 97.6  F (36.4  C)  Resp 24  Wt 174 lb (78.9 kg)  SpO2 91%  BMI 27.25 kg/m2  GENERAL APPEARANCE:  Lethargic, in no distress. Hands warm to touch    ASSESSMENT/PLAN:  Dementia with Overall Decline and Transition to Hospice. Reviewed medications with daughterMayi. As goal is comfort, and patient is less responsive, discontinued all medications except Voltaren gel. Initiated Roxanol 2.5 mg q2h PRN, Atropine drops PRN and Lorazepam 0.5 mg q2h PRN. Will sign onto [facility] 8/10/17 with eventual transition to long term care at facility.     Total time spent with patient visit at the skilled nursing facility was 25 min including patient visit, review of past records  and phone call to patient contact. Greater than 50% of total time spent with counseling and coordinating care due to discussion with daughter Mayi via telephone and staff    GRISELDA Wyatt CNP

## 2025-01-02 NOTE — TELEPHONE ENCOUNTER
I have called over the coumadin   He needs to be established through our INR clinic   I will do that after I get more infor from nurse tomorrow.     Dose at discharge 1 month ago was 2.5 mg tabs 1 q day    Short rx [facility] faxed to [phone]    
Notified nurse of the orders below.  Routed to INR nurse as ROLANDO Monroe, RN [facility] Triage.    
OK for physical therapy/OT eval called to [facility]   They will fax request   Call INR results to INR clinic   If patient transfers care to someone there then we will d/c this.      
Reason for Call:  Other call back    Detailed comments: Please call home care nurse to give verbal orders asap    Phone Number Patient can be reached at: Other phone number:  [phone]    Best Time: asap    Can we leave a detailed message on this number? YES    Call taken on 5/5/2017 at 4:16 PM by Lawson Fagan      
Reason for Call: Request for an order or referral:    Order  being requested: Home care orders    Date needed: as soon as possible    Additional comments: Requesting physical therapy 2 times a week for 6 weeks. Home health aid for bathing 1 time a week for 6 weeks. Occupational therapy evaluation. Patient was discharged from the hospital recently from having a stroke and now moved and is living in assisted living.    Phone number Patient can be reached at:    [facility] at home [phone]     Best Time:  anytime    Can we leave a detailed message on this number?  YES    Call taken on 5/4/2017 at 1:38 PM by Terrie Garcia    
Routed to PCP for orders.  Aida Monroe RN CPC Triage.    
Candie